# Patient Record
Sex: FEMALE | Race: WHITE
[De-identification: names, ages, dates, MRNs, and addresses within clinical notes are randomized per-mention and may not be internally consistent; named-entity substitution may affect disease eponyms.]

---

## 2018-11-28 ENCOUNTER — HOSPITAL ENCOUNTER (INPATIENT)
Dept: HOSPITAL 88 - ER | Age: 83
LOS: 7 days | Discharge: HOME | DRG: 194 | End: 2018-12-05
Attending: INTERNAL MEDICINE | Admitting: INTERNAL MEDICINE
Payer: MEDICARE

## 2018-11-28 VITALS — BODY MASS INDEX: 26.2 KG/M2 | WEIGHT: 163.01 LBS | HEIGHT: 66 IN

## 2018-11-28 DIAGNOSIS — Z88.8: ICD-10-CM

## 2018-11-28 DIAGNOSIS — Z85.3: ICD-10-CM

## 2018-11-28 DIAGNOSIS — E87.1: ICD-10-CM

## 2018-11-28 DIAGNOSIS — J91.8: ICD-10-CM

## 2018-11-28 DIAGNOSIS — J15.9: ICD-10-CM

## 2018-11-28 DIAGNOSIS — I50.22: ICD-10-CM

## 2018-11-28 DIAGNOSIS — Z88.5: ICD-10-CM

## 2018-11-28 DIAGNOSIS — Z86.11: ICD-10-CM

## 2018-11-28 DIAGNOSIS — Z95.0: ICD-10-CM

## 2018-11-28 DIAGNOSIS — J47.9: ICD-10-CM

## 2018-11-28 DIAGNOSIS — Z87.891: ICD-10-CM

## 2018-11-28 DIAGNOSIS — M19.90: ICD-10-CM

## 2018-11-28 DIAGNOSIS — I25.10: ICD-10-CM

## 2018-11-28 DIAGNOSIS — Z88.1: ICD-10-CM

## 2018-11-28 DIAGNOSIS — Z85.53: ICD-10-CM

## 2018-11-28 DIAGNOSIS — I44.7: ICD-10-CM

## 2018-11-28 DIAGNOSIS — E78.00: ICD-10-CM

## 2018-11-28 DIAGNOSIS — I11.0: ICD-10-CM

## 2018-11-28 DIAGNOSIS — R09.02: ICD-10-CM

## 2018-11-28 DIAGNOSIS — J30.2: ICD-10-CM

## 2018-11-28 DIAGNOSIS — J09.X1: Primary | ICD-10-CM

## 2018-11-28 DIAGNOSIS — J47.1: ICD-10-CM

## 2018-11-28 DIAGNOSIS — K59.00: ICD-10-CM

## 2018-11-28 LAB
ALBUMIN SERPL-MCNC: 3.6 G/DL (ref 3.5–5)
ALBUMIN/GLOB SERPL: 0.9 {RATIO} (ref 0.8–2)
ALP SERPL-CCNC: 89 IU/L (ref 40–150)
ALT SERPL-CCNC: 36 IU/L (ref 0–55)
ANION GAP SERPL CALC-SCNC: 15.5 MMOL/L (ref 8–16)
BACTERIA URNS QL MICRO: (no result) /HPF
BASOPHILS # BLD AUTO: 0 10*3/UL (ref 0–0.1)
BASOPHILS NFR BLD AUTO: 0.1 % (ref 0–1)
BILIRUB UR QL: NEGATIVE
BUN SERPL-MCNC: 11 MG/DL (ref 7–26)
BUN/CREAT SERPL: 11 (ref 6–25)
CALCIUM SERPL-MCNC: 9.7 MG/DL (ref 8.4–10.2)
CHLORIDE SERPL-SCNC: 93 MMOL/L (ref 98–107)
CK MB SERPL-MCNC: 3.5 NG/ML (ref 0–5)
CK SERPL-CCNC: 293 IU/L (ref 29–168)
CLARITY UR: (no result)
CO2 SERPL-SCNC: 27 MMOL/L (ref 22–29)
COLOR UR: YELLOW
DEPRECATED NEUTROPHILS # BLD AUTO: 13.7 10*3/UL (ref 2.1–6.9)
DEPRECATED RBC URNS MANUAL-ACNC: (no result) /HPF (ref 0–5)
EGFRCR SERPLBLD CKD-EPI 2021: 52 ML/MIN (ref 60–?)
EOSINOPHIL # BLD AUTO: 0 10*3/UL (ref 0–0.4)
EOSINOPHIL NFR BLD AUTO: 0 % (ref 0–6)
EPI CELLS URNS QL MICRO: (no result) /LPF
ERYTHROCYTE [DISTWIDTH] IN CORD BLOOD: 13.1 % (ref 11.7–14.4)
GLOBULIN PLAS-MCNC: 4.1 G/DL (ref 2.3–3.5)
GLUCOSE SERPLBLD-MCNC: 115 MG/DL (ref 74–118)
HCT VFR BLD AUTO: 40.6 % (ref 34.2–44.1)
HGB BLD-MCNC: 14.1 G/DL (ref 12–16)
KETONES UR QL STRIP.AUTO: NEGATIVE
LEUKOCYTE ESTERASE UR QL STRIP.AUTO: NEGATIVE
LYMPHOCYTES # BLD: 0.7 10*3/UL (ref 1–3.2)
LYMPHOCYTES NFR BLD AUTO: 4.5 % (ref 18–39.1)
LYMPHOCYTES NFR BLD MANUAL: 7 % (ref 19–48)
MCH RBC QN AUTO: 30.6 PG (ref 28–32)
MCHC RBC AUTO-ENTMCNC: 34.7 G/DL (ref 31–35)
MCV RBC AUTO: 88.1 FL (ref 81–99)
MONOCYTES # BLD AUTO: 1.6 10*3/UL (ref 0.2–0.8)
MONOCYTES NFR BLD AUTO: 9.6 % (ref 4.4–11.3)
MONOCYTES NFR BLD MANUAL: 5 % (ref 3.4–9)
NEUTS SEG NFR BLD AUTO: 85.2 % (ref 38.7–80)
NEUTS SEG NFR BLD MANUAL: 88 % (ref 40–74)
NITRITE UR QL STRIP.AUTO: NEGATIVE
PLAT MORPH BLD: NORMAL
PLATELET # BLD AUTO: 171 X10E3/UL (ref 140–360)
PLATELET # BLD EST: ADEQUATE 10*3/UL
POTASSIUM SERPL-SCNC: 3.5 MMOL/L (ref 3.5–5.1)
PROT UR QL STRIP.AUTO: (no result)
RBC # BLD AUTO: 4.61 X10E6/UL (ref 3.6–5.1)
RBC MORPH BLD: NORMAL
SODIUM SERPL-SCNC: 132 MMOL/L (ref 136–145)
SP GR UR STRIP: 1.01 (ref 1.01–1.02)
UROBILINOGEN UR STRIP-MCNC: 0.2 MG/DL (ref 0.2–1)
WBC #/AREA URNS HPF: (no result) /HPF (ref 0–5)

## 2018-11-28 PROCEDURE — 99284 EMERGENCY DEPT VISIT MOD MDM: CPT

## 2018-11-28 PROCEDURE — 83880 ASSAY OF NATRIURETIC PEPTIDE: CPT

## 2018-11-28 PROCEDURE — 87205 SMEAR GRAM STAIN: CPT

## 2018-11-28 PROCEDURE — 81001 URINALYSIS AUTO W/SCOPE: CPT

## 2018-11-28 PROCEDURE — 84443 ASSAY THYROID STIM HORMONE: CPT

## 2018-11-28 PROCEDURE — 83615 LACTATE (LD) (LDH) ENZYME: CPT

## 2018-11-28 PROCEDURE — 93306 TTE W/DOPPLER COMPLETE: CPT

## 2018-11-28 PROCEDURE — 80048 BASIC METABOLIC PNL TOTAL CA: CPT

## 2018-11-28 PROCEDURE — 85025 COMPLETE CBC W/AUTO DIFF WBC: CPT

## 2018-11-28 PROCEDURE — 87086 URINE CULTURE/COLONY COUNT: CPT

## 2018-11-28 PROCEDURE — 87070 CULTURE OTHR SPECIMN AEROBIC: CPT

## 2018-11-28 PROCEDURE — 36415 COLL VENOUS BLD VENIPUNCTURE: CPT

## 2018-11-28 PROCEDURE — 82550 ASSAY OF CK (CPK): CPT

## 2018-11-28 PROCEDURE — 71045 X-RAY EXAM CHEST 1 VIEW: CPT

## 2018-11-28 PROCEDURE — 93005 ELECTROCARDIOGRAM TRACING: CPT

## 2018-11-28 PROCEDURE — 82553 CREATINE MB FRACTION: CPT

## 2018-11-28 PROCEDURE — 87400 INFLUENZA A/B EACH AG IA: CPT

## 2018-11-28 PROCEDURE — 83605 ASSAY OF LACTIC ACID: CPT

## 2018-11-28 PROCEDURE — 80053 COMPREHEN METABOLIC PANEL: CPT

## 2018-11-28 PROCEDURE — 94640 AIRWAY INHALATION TREATMENT: CPT

## 2018-11-28 PROCEDURE — 83735 ASSAY OF MAGNESIUM: CPT

## 2018-11-28 PROCEDURE — 94667 MNPJ CHEST WALL 1ST: CPT

## 2018-11-28 PROCEDURE — 84484 ASSAY OF TROPONIN QUANT: CPT

## 2018-11-28 PROCEDURE — 94668 MNPJ CHEST WALL SBSQ: CPT

## 2018-11-28 PROCEDURE — 87040 BLOOD CULTURE FOR BACTERIA: CPT

## 2018-11-28 RX ADMIN — SODIUM CHLORIDE SCH GM: 9 INJECTION, SOLUTION INTRAVENOUS at 22:19
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
## 2018-11-28 NOTE — XMS REPORT
Clinical Summary

                             Created on: 2018



Mayra Bender

External Reference #: [id number]

: 1935

Sex: Female



Demographics







                          Address                   [address] [place]  [address]

 

                          Home Phone                [phone]

 

                          Preferred Language        English

 

                          Marital Status            Unknown

 

                          Evangelical Affiliation     Latter-day

 

                          Race                      White

 

                          Ethnic Group              Non-





Author







                          Author                    [facility]

 

                          Organization              [facility]

 

                          Address                   Unknown

 

                          Phone                     Unavailable







Support







                Name            Relationship    Address         Phone

 

                    Juan Diego Bender      ECON                [address]

[place]  [address]                      [phone]

 

                Tavia Bender     ECON            Unknown         [phone]







Care Team Providers







                    Care Team Member Name    Role                Phone

 

                    Brian Redding       PCP                 [phone]







Allergies







                                        Comments



                 Active Allergy     Reactions       Severity        Noted Date 

 

                                        



Jaw and tibial pain



                     Risedronate         Other (See          2014 



                                         Comments)   

 

                                        



White tape,"2nd degree burns"



                 Adhesive        Rash            Low             2015 

 

                                        



Severe jaw and tibial pain



                 Alendronate     Other (See      High            2014 



                                         Comments),   



                                         Anaphylaxis   

 

                                        



Jaw pain, gastric pain, tibial pain



                           Bisphosphonates           2017 

 

                                         



                 Oxaprozin       Rash            Low             2014 

 

                                        



Severe dream intolerances



                     Ofloxacin           Other (See          2014 



                                         Comments)   

 

                                         



                     Hydrocodone         Nausea And          2014 



                                         Vomiting   

 

                                         



                     Quinolones          Diarrhea,           2017 



                                         Nausea And   



                                         Vomiting   

 

                                        



Decreased  blood pressure



                     Tizanidine          Other (See          2014 



                                         Comments)   







Medications







                          End Date                  Status



              Medication     Sig          Dispensed     Refills      Start  



                                         Date  

 

                                                    Active



                     amiodarone (PACERONE) 100     Take 200 mg         0   



                           MG tablet                 by mouth     



                                         daily .     

 

                                                    Active



                     CALCIUM CITRATE/VITAMIN     Take 1              0   



                           D3 (CALCIUM CITRATE +     capsule by     



                           ORAL)                     mouth 2 (two)     



                                         times daily .     

 

                                                    Active



                     FERROUS FUMARATE (IRON     Take by mouth       0   



                           ORAL)                     2 (two) times     



                                         daily .     

 

                                                    Active



                     loratadine (CLARITIN) 10     Take 10 mg by       0   



                           mg tablet                 mouth daily.     

 

                                                    Active



                     metoprolol (TOPROL-XL) 25     Take 25 mg by       0   



                           MG 24 hr tablet           mouth daily.     

 

                                                    Active



                     mometasone (NASONEX) 50     2 sprays by         0   



                           mcg/actuation nasal spray     Nasal route     



                                         daily.     

 

                                                    Active



                     multivitamin        Take 1 tablet       0   



                           (MULTIVITAMIN) per tablet     by mouth     



                                         daily.     

 

                                                    Active



                     OMEGA-3S/DHA/EPA/FISH OIL     Take by             0   



                           (OMEGA 3 ORAL)            mouth.     

 

                                                    Active



                     tiotropium (SPIRIVA) 18     Inhale 18 mcg       0   



                           mcg inhalation capsule     by mouth via     



                                         inhaler     



                                         daily.     

 

                                                    Active



                     aspirin 325 MG tablet     Take 325 mg         0   



                                         by mouth     



                                         daily.     

 

                                                    Active



                     LACTOBACILLUS ACIDOPHILUS     Take 1              0   



                           (PROBIOTIC ORAL)          capsule by     



                                         mouth daily .     

 

                                                    Active



                     ketotifen (ZADITOR) 0.025     1 drop 2            0   



                           % ophthalmic solution     (two) times     



                                         daily.     

 

                                                    Active



                     bisacodyl (DULCOLAX) 5 mg     Take 5 mg by        0   



                           EC tablet                 mouth daily     



                                         as needed for     



                                         Constipation.     

 

                                                    Active



                     anastrozole (ARIMIDEX) 1     Take 1 mg by        0   



                           mg tablet                 mouth daily.     

 

                                                    Active



                     budesonide (PULMICORT) 1     Take 1 mg by        0   



                           mg/2 mL nebulizer         nebulization     



                           solution                  2 (two) times     



                                         daily.     

 

                                                    Active



                     albuterol (PROVENTIL) 2.5     Take 2.5 mg         0   



                           mg/0.5 mL Nebu nebulizer     by     



                           solution                  nebulization.     

 

                                                    Active



                     lactobacillus rhamnosus,     Take 1              0   



                           GG, (CULTURELLE) 10       capsule by     



                           billion cell capsule      mouth as     



                                         needed.     

 

                                                    Active



                     cholecalciferol (VITAMIN     Take 1,000          0   



                           D3) 1,000 unit tablet     Units by     



                                         mouth daily.     

 

                                                    Active



                     magnesium oxide (MAG-OX)     Take 400 mg         0   



                           400 mg tablet             by mouth     



                                         daily.     

 

                          2018                



              amiodarone (PACERONE) 200     Take 1 tablet     30 tablet     12             



                     MG tablet           (200 mg             7  



                                         total) by     



                                         mouth daily.     

 

                          2018                



              aspirin 325 MG tablet     Take 1 tablet     30 tablet     12             



                           (325 mg                   7  



                                         total) by     



                                         mouth daily.     







Active Problems







 



                           Problem                   Noted Date

 

 



                           Abnormal stress test      2017

 

 



                           Bronchiectasis            2015

 

 



                           SANDEEP on CPAP               2015

 

 



                           COPD (chronic obstructive pulmonary disease)     2015

 

 



                           PAF (paroxysmal atrial fibrillation)     2015

 

 



                           Renal mass                2015







Social History







                                        Date



                 Tobacco Use     Types           Packs/Day       Years Used 

 

                                         



                                         Never Smoker    

 

    



                                         Smokeless Tobacco: Never   



                                         Used   









   



                 Alcohol Use     Drinks/Week     oz/Week         Comments

 

   



                                         No   









 



                           Sex Assigned at Birth     Date Recorded

 

 



                                         Not on file 









                                        Industry



                           Job Start Date            Occupation 

 

                                        Not on file



                           Not on file               Not on file 









                                        Travel End



                           Travel History            Travel Start 

 





                                         No recent travel history available.







Last Filed Vital Signs

Not on file



Plan of Treatment





Not on file



Implants







                    Device Identifier    Shelf Expiration Date    Model / Serial / Lot



                 Implanted       Type            Area            Manufactur   



                                         er   

 

                                        10/31/2016          [id number] /

 /

[id number]



                 Sealant,Floseal Hemostatic Matrix     Cement/Moise      Left: Abdomen     SPAULDING   



                     5ml - [id number]     ler/Adhesi          BIOSCIENCE   



                     Implanted: Qty: 2 on 2015 by     ve                  Varun Bruce MD       [employer]   



                                         MEDICAL   







Results

Not on fileafter 2017



Insurance







     



            Payer      Benefit     Subscriber ID     Type       Phone      Address



                                         Plan /    



                                         Group    

 

     



                     [facility]   



                                         MEDICARE    



                                         ADV    









     



            Guarantor Name     Account     Relation to     Date of     Phone      Billing Address



                     Type                Patient             Birth  

 

     



            Mayra Bender     Personal/F     Self       1935     [phone]     [address]



                     umm               (Home)              [place] [address]







Advance Directives





Patient has advance care planning documents, and code status on file. For more i
nformation, please contact:



[place] [id number] [place] [address] [phone]









                          Date Inactivated          Comments



                           Code Status               Date Activated  

 

                          2017 12:54 PM         



                           Full Code                 2017  6:15 AM  









  



                           This code status was determined by:     Patient 









                                                     

 

                          2015  2:20 PM         



                           Full Code                 2015  4:52 PM  









  



                           This code status was determined by:     Patient

## 2018-11-28 NOTE — XMS REPORT
Patient Summary Document

                             Created on: 2018



ZITA MIRANDA

External Reference #: [id number]

: 1935

Sex: Female



Demographics







                          Address                   [address] [place] [address]  [address]

 

                          Home Phone                [phone]

 

                          Preferred Language        Unknown

 

                          Marital Status            Unknown

 

                          Jain Affiliation     Unknown

 

                          Race                      Unknown

 

                          Ethnic Group              Unknown





Author







                          Author                    [facility]

 

                          Address                   Unknown

 

                          Phone                     Unavailable







Care Team Providers







                    Care Team Member Name    Role                Phone

 

                    CARISA TREVINO    Unavailable         Unavailable







Problems

This patient has no known problems.



Allergies, Adverse Reactions, Alerts

This patient has no known allergies or adverse reactions.



Medications

This patient has no known medications.



Results







           Test Description    Test Time    Test Comments    Text Results    Atomic Results    Result

 Comments

 

                BASIC METABOLIC PANEL    2017 03:31:00                      

 

   

 

                SODIUM (BEAKER) (test code=381)    136 meq/L       136-145          

 

                POTASSIUM (BEAKER) (test code=379)    4.0 meq/L       3.5-5.1          

 

                CHLORIDE (BEAKER) (test code=382)    101 meq/L                  

 

                CO2 (BEAKER) (test code=355)    26 meq/L        22-29            

 

                BLOOD UREA NITROGEN (BEAKER) (test code=354)    18 mg/dL        7-21             

 

                CREATININE (BEAKER) (test code=358)    1.14 mg/dL      0.57-1.25        

 

                GLUCOSE RANDOM (BEAKER) (test code=652)    104 mg/dL                  

 

                CALCIUM (BEAKER) (test code=697)    9.2 mg/dL       8.4-10.2         

 

                EGFR (BEAKER) (test code=1092)    46 mL/min/1.73 sq m                    ESTIMATED GFR IS NOT AS 

ACCURATE AS CREATININE CLEARANCE IN PREDICTING GLOMERULAR FILTRATION RATE. 
ESTIMATED GFR IS NOT APPLICABLE FOR DIALYSIS PATIENTS.





CBC W/PLT COUNT & AUTO [id number] 03:09:00* 





                Test Item       Value           Reference Range    Comments

 

                WHITE BLOOD CELL COUNT (BEAKER) (test code=775)    7.1 K/ L        4.0-10.0         

 

                RED BLOOD CELL COUNT (BEAKER) (test code=761)    4.19 M/ L       4.00-5.00        

 

                HEMOGLOBIN (BEAKER) (test code=410)    13.4 GM/DL      12.0-15.0        

 

                HEMATOCRIT (BEAKER) (test code=411)    39.1 %          36.0-45.0        

 

                MEAN CORPUSCULAR VOLUME (BEAKER) (test code=753)    93.3 fL         82.0-99.0        

 

                MEAN CORPUSCULAR HEMOGLOBIN (BEAKER) (test code=751)    32.0 pg         27.0-33.0        

 

                    MEAN CORPUSCULAR HEMOGLOBIN CONC (BEAKER) (test code=752)    34.3 GM/DL          32.0-36.0

                                         

 

                RED CELL DISTRIBUTION WIDTH (BEAKER) (test code=412)    13.4 %          10.3-14.2        

 

                PLATELET COUNT (BEAKER) (test code=756)    216 K/CU MM     150-430          

 

                MEAN PLATELET VOLUME (BEAKER) (test code=754)    7.3 fL          6.5-10.5         

 

                NUCLEATED RED BLOOD CELLS (BEAKER) (test code=413)    0 /100 WBC      0-0              

 

                NEUTROPHILS RELATIVE PERCENT (BEAKER) (test code=429)    69 %                             

 

                LYMPHOCYTES RELATIVE PERCENT (BEAKER) (test code=430)    17 %                             

 

                MONOCYTES RELATIVE PERCENT (BEAKER) (test code=431)    13 %                             

 

                EOSINOPHILS RELATIVE PERCENT (BEAKER) (test code=432)    1 %                              

 

                BASOPHILS RELATIVE PERCENT (BEAKER) (test code=437)    1 %                              

 

                NEUTROPHILS ABSOLUTE COUNT (BEAKER) (test code=670)    4.90 K/ L       1.80-8.00        

 

                LYMPHOCYTES ABSOLUTE COUNT (BEAKER) (test code=414)    1.18 K/ L       1.48-4.50        

 

                MONOCYTES ABSOLUTE COUNT (BEAKER) (test code=415)    0.90 K/ L       0.00-1.30        

 

                EOSINOPHILS ABSOLUTE COUNT (BEAKER) (test code=416)    0.09 K/ L       0.00-0.50        

 

                BASOPHILS ABSOLUTE COUNT (BEAKER) (test code=417)    0.05 K/ L       0.00-0.20        





[id number] 16:35:00* 





                Test Item       Value           Reference Range    Comments

 

                ACTIVATED CLOTTING TIME (BEAKER) (test code=441)    137 sec                         TESTED AT [address] [address]





[id number] 14:26:00* 





                Test Item       Value           Reference Range    Comments

 

                ACTIVATED CLOTTING TIME (BEAKER) (test code=441)    147 sec                         TESTED AT [address] [address]





[id number] 12:46:00* 





                Test Item       Value           Reference Range    Comments

 

                ACTIVATED CLOTTING TIME (BEAKER) (test code=441)    167 sec                         TESTED AT [place] [address]





[id number] 09:51:00* 





                Test Item       Value           Reference Range    Comments

 

                ACTIVATED CLOTTING TIME (BEAKER) (test code=441)    234 sec                         TESTED AT [place] [address]





[id number] 09:51:00* 





                Test Item       Value           Reference Range    Comments

 

                ACTIVATED CLOTTING TIME (BEAKER) (test code=441)    214 sec                         TESTED AT [place] [address]

## 2018-11-29 VITALS — DIASTOLIC BLOOD PRESSURE: 65 MMHG | SYSTOLIC BLOOD PRESSURE: 149 MMHG

## 2018-11-29 VITALS — DIASTOLIC BLOOD PRESSURE: 77 MMHG | SYSTOLIC BLOOD PRESSURE: 161 MMHG

## 2018-11-29 VITALS — SYSTOLIC BLOOD PRESSURE: 161 MMHG | DIASTOLIC BLOOD PRESSURE: 77 MMHG

## 2018-11-29 VITALS — DIASTOLIC BLOOD PRESSURE: 61 MMHG | SYSTOLIC BLOOD PRESSURE: 131 MMHG

## 2018-11-29 VITALS — DIASTOLIC BLOOD PRESSURE: 80 MMHG | SYSTOLIC BLOOD PRESSURE: 132 MMHG

## 2018-11-29 VITALS — DIASTOLIC BLOOD PRESSURE: 60 MMHG | SYSTOLIC BLOOD PRESSURE: 124 MMHG

## 2018-11-29 VITALS — DIASTOLIC BLOOD PRESSURE: 66 MMHG | SYSTOLIC BLOOD PRESSURE: 129 MMHG

## 2018-11-29 VITALS — SYSTOLIC BLOOD PRESSURE: 127 MMHG | DIASTOLIC BLOOD PRESSURE: 57 MMHG

## 2018-11-29 LAB
CK MB SERPL-MCNC: 3.6 NG/ML (ref 0–5)
CK SERPL-CCNC: 219 IU/L (ref 29–168)

## 2018-11-29 RX ADMIN — HEPARIN SODIUM SCH UNIT: 5000 INJECTION INTRAVENOUS; SUBCUTANEOUS at 21:20

## 2018-11-29 RX ADMIN — SODIUM CHLORIDE SCH MLS/HR: 900 INJECTION, SOLUTION INTRAVENOUS at 13:33

## 2018-11-29 RX ADMIN — IPRATROPIUM BROMIDE AND ALBUTEROL SULFATE SCH ML: .5; 2.5 SOLUTION RESPIRATORY (INHALATION) at 02:00

## 2018-11-29 RX ADMIN — ANASTROZOLE SCH MG: 1 TABLET ORAL at 17:10

## 2018-11-29 RX ADMIN — IPRATROPIUM BROMIDE AND ALBUTEROL SULFATE SCH ML: .5; 2.5 SOLUTION RESPIRATORY (INHALATION) at 07:20

## 2018-11-29 RX ADMIN — TIOTROPIUM BROMIDE SCH MCG: 18 CAPSULE ORAL; RESPIRATORY (INHALATION) at 19:15

## 2018-11-29 RX ADMIN — Medication SCH ML: at 19:15

## 2018-11-29 RX ADMIN — SODIUM CHLORIDE PRN MG: 900 INJECTION INTRAVENOUS at 17:10

## 2018-11-29 RX ADMIN — SODIUM CHLORIDE SCH GM: 9 INJECTION, SOLUTION INTRAVENOUS at 17:09

## 2018-11-29 RX ADMIN — GUAIFENESIN SCH MG: 600 TABLET, EXTENDED RELEASE ORAL at 21:31

## 2018-11-29 RX ADMIN — HEPARIN SODIUM SCH UNIT: 5000 INJECTION INTRAVENOUS; SUBCUTANEOUS at 13:33

## 2018-11-29 RX ADMIN — FLUTICASONE PROPIONATE AND SALMETEROL SCH EA: 50; 100 POWDER RESPIRATORY (INHALATION) at 19:15

## 2018-11-29 RX ADMIN — Medication SCH ML: at 11:10

## 2018-11-29 RX ADMIN — Medication SCH ML: at 14:30

## 2018-11-30 VITALS — DIASTOLIC BLOOD PRESSURE: 67 MMHG | SYSTOLIC BLOOD PRESSURE: 131 MMHG

## 2018-11-30 VITALS — SYSTOLIC BLOOD PRESSURE: 131 MMHG | DIASTOLIC BLOOD PRESSURE: 67 MMHG

## 2018-11-30 VITALS — SYSTOLIC BLOOD PRESSURE: 130 MMHG | DIASTOLIC BLOOD PRESSURE: 79 MMHG

## 2018-11-30 VITALS — SYSTOLIC BLOOD PRESSURE: 140 MMHG | DIASTOLIC BLOOD PRESSURE: 67 MMHG

## 2018-11-30 VITALS — DIASTOLIC BLOOD PRESSURE: 55 MMHG | SYSTOLIC BLOOD PRESSURE: 122 MMHG

## 2018-11-30 VITALS — DIASTOLIC BLOOD PRESSURE: 69 MMHG | SYSTOLIC BLOOD PRESSURE: 107 MMHG

## 2018-11-30 VITALS — DIASTOLIC BLOOD PRESSURE: 78 MMHG | SYSTOLIC BLOOD PRESSURE: 107 MMHG

## 2018-11-30 LAB
ANION GAP SERPL CALC-SCNC: 12.9 MMOL/L (ref 8–16)
BASOPHILS # BLD AUTO: 0 10*3/UL (ref 0–0.1)
BASOPHILS NFR BLD AUTO: 0.3 % (ref 0–1)
BUN SERPL-MCNC: 13 MG/DL (ref 7–26)
BUN/CREAT SERPL: 13 (ref 6–25)
CALCIUM SERPL-MCNC: 9.2 MG/DL (ref 8.4–10.2)
CHLORIDE SERPL-SCNC: 100 MMOL/L (ref 98–107)
CO2 SERPL-SCNC: 29 MMOL/L (ref 22–29)
DEPRECATED NEUTROPHILS # BLD AUTO: 7.1 10*3/UL (ref 2.1–6.9)
EGFRCR SERPLBLD CKD-EPI 2021: 54 ML/MIN (ref 60–?)
EOSINOPHIL # BLD AUTO: 0 10*3/UL (ref 0–0.4)
EOSINOPHIL NFR BLD AUTO: 0.3 % (ref 0–6)
ERYTHROCYTE [DISTWIDTH] IN CORD BLOOD: 13.1 % (ref 11.7–14.4)
GLUCOSE SERPLBLD-MCNC: 109 MG/DL (ref 74–118)
HCT VFR BLD AUTO: 36.2 % (ref 34.2–44.1)
HGB BLD-MCNC: 12.2 G/DL (ref 12–16)
LYMPHOCYTES # BLD: 0.9 10*3/UL (ref 1–3.2)
LYMPHOCYTES NFR BLD AUTO: 10 % (ref 18–39.1)
MCH RBC QN AUTO: 30.3 PG (ref 28–32)
MCHC RBC AUTO-ENTMCNC: 33.7 G/DL (ref 31–35)
MCV RBC AUTO: 90 FL (ref 81–99)
MONOCYTES # BLD AUTO: 1 10*3/UL (ref 0.2–0.8)
MONOCYTES NFR BLD AUTO: 11.2 % (ref 4.4–11.3)
NEUTS SEG NFR BLD AUTO: 77.8 % (ref 38.7–80)
PLATELET # BLD AUTO: 169 X10E3/UL (ref 140–360)
POTASSIUM SERPL-SCNC: 3.9 MMOL/L (ref 3.5–5.1)
RBC # BLD AUTO: 4.02 X10E6/UL (ref 3.6–5.1)
SODIUM SERPL-SCNC: 138 MMOL/L (ref 136–145)

## 2018-11-30 RX ADMIN — SODIUM CHLORIDE PRN MG: 900 INJECTION INTRAVENOUS at 17:43

## 2018-11-30 RX ADMIN — Medication SCH ML: at 20:50

## 2018-11-30 RX ADMIN — ANASTROZOLE SCH MG: 1 TABLET ORAL at 09:29

## 2018-11-30 RX ADMIN — HEPARIN SODIUM SCH UNIT: 5000 INJECTION INTRAVENOUS; SUBCUTANEOUS at 09:34

## 2018-11-30 RX ADMIN — TIOTROPIUM BROMIDE SCH MCG: 18 CAPSULE ORAL; RESPIRATORY (INHALATION) at 14:54

## 2018-11-30 RX ADMIN — Medication SCH MG: at 14:53

## 2018-11-30 RX ADMIN — AMIODARONE HYDROCHLORIDE SCH MG: 200 TABLET ORAL at 09:29

## 2018-11-30 RX ADMIN — FLUTICASONE PROPIONATE AND SALMETEROL SCH EA: 50; 100 POWDER RESPIRATORY (INHALATION) at 20:50

## 2018-11-30 RX ADMIN — Medication SCH MG: at 20:50

## 2018-11-30 RX ADMIN — GUAIFENESIN SCH MG: 600 TABLET, EXTENDED RELEASE ORAL at 21:50

## 2018-11-30 RX ADMIN — Medication SCH ML: at 07:00

## 2018-11-30 RX ADMIN — Medication SCH MG: at 11:00

## 2018-11-30 RX ADMIN — GUAIFENESIN SCH MG: 600 TABLET, EXTENDED RELEASE ORAL at 09:29

## 2018-11-30 RX ADMIN — HEPARIN SODIUM SCH UNIT: 5000 INJECTION INTRAVENOUS; SUBCUTANEOUS at 21:51

## 2018-11-30 RX ADMIN — SODIUM CHLORIDE SCH GM: 9 INJECTION, SOLUTION INTRAVENOUS at 18:01

## 2018-11-30 RX ADMIN — ASPIRIN SCH MG: 81 TABLET, COATED ORAL at 09:29

## 2018-11-30 RX ADMIN — Medication SCH ML: at 10:47

## 2018-11-30 RX ADMIN — SODIUM CHLORIDE SCH MLS/HR: 900 INJECTION, SOLUTION INTRAVENOUS at 12:32

## 2018-11-30 RX ADMIN — TIOTROPIUM BROMIDE SCH MCG: 18 CAPSULE ORAL; RESPIRATORY (INHALATION) at 07:03

## 2018-11-30 RX ADMIN — SODIUM CHLORIDE PRN MG: 900 INJECTION INTRAVENOUS at 09:29

## 2018-11-30 RX ADMIN — SODIUM CHLORIDE SCH MLS/HR: 4.5 INJECTION, SOLUTION INTRAVENOUS at 09:29

## 2018-11-30 RX ADMIN — Medication SCH ML: at 14:54

## 2018-11-30 RX ADMIN — FLUTICASONE PROPIONATE AND SALMETEROL SCH EA: 50; 100 POWDER RESPIRATORY (INHALATION) at 07:00

## 2018-12-01 VITALS — DIASTOLIC BLOOD PRESSURE: 76 MMHG | SYSTOLIC BLOOD PRESSURE: 143 MMHG

## 2018-12-01 VITALS — DIASTOLIC BLOOD PRESSURE: 59 MMHG | SYSTOLIC BLOOD PRESSURE: 128 MMHG

## 2018-12-01 VITALS — SYSTOLIC BLOOD PRESSURE: 142 MMHG | DIASTOLIC BLOOD PRESSURE: 60 MMHG

## 2018-12-01 VITALS — SYSTOLIC BLOOD PRESSURE: 143 MMHG | DIASTOLIC BLOOD PRESSURE: 76 MMHG

## 2018-12-01 VITALS — DIASTOLIC BLOOD PRESSURE: 65 MMHG | SYSTOLIC BLOOD PRESSURE: 127 MMHG

## 2018-12-01 VITALS — DIASTOLIC BLOOD PRESSURE: 69 MMHG | SYSTOLIC BLOOD PRESSURE: 125 MMHG

## 2018-12-01 VITALS — SYSTOLIC BLOOD PRESSURE: 129 MMHG | DIASTOLIC BLOOD PRESSURE: 87 MMHG

## 2018-12-01 RX ADMIN — Medication SCH ML: at 11:56

## 2018-12-01 RX ADMIN — SODIUM CHLORIDE SCH MLS/HR: 900 INJECTION, SOLUTION INTRAVENOUS at 13:15

## 2018-12-01 RX ADMIN — ANASTROZOLE SCH MG: 1 TABLET ORAL at 10:59

## 2018-12-01 RX ADMIN — SODIUM CHLORIDE PRN MG: 900 INJECTION INTRAVENOUS at 17:57

## 2018-12-01 RX ADMIN — Medication SCH ML: at 15:24

## 2018-12-01 RX ADMIN — Medication SCH MG: at 07:55

## 2018-12-01 RX ADMIN — FLUTICASONE PROPIONATE AND SALMETEROL SCH EA: 50; 100 POWDER RESPIRATORY (INHALATION) at 20:59

## 2018-12-01 RX ADMIN — GUAIFENESIN SCH MG: 600 TABLET, EXTENDED RELEASE ORAL at 21:20

## 2018-12-01 RX ADMIN — HEPARIN SODIUM SCH UNIT: 5000 INJECTION INTRAVENOUS; SUBCUTANEOUS at 11:04

## 2018-12-01 RX ADMIN — Medication SCH ML: at 21:00

## 2018-12-01 RX ADMIN — HEPARIN SODIUM SCH UNIT: 5000 INJECTION INTRAVENOUS; SUBCUTANEOUS at 21:23

## 2018-12-01 RX ADMIN — Medication SCH MG: at 21:00

## 2018-12-01 RX ADMIN — TIOTROPIUM BROMIDE SCH MCG: 18 CAPSULE ORAL; RESPIRATORY (INHALATION) at 17:00

## 2018-12-01 RX ADMIN — Medication SCH MG: at 11:56

## 2018-12-01 RX ADMIN — GUAIFENESIN SCH MG: 600 TABLET, EXTENDED RELEASE ORAL at 10:59

## 2018-12-01 RX ADMIN — SODIUM CHLORIDE SCH GM: 9 INJECTION, SOLUTION INTRAVENOUS at 17:57

## 2018-12-01 RX ADMIN — TIOTROPIUM BROMIDE SCH MCG: 18 CAPSULE ORAL; RESPIRATORY (INHALATION) at 07:55

## 2018-12-01 RX ADMIN — ASPIRIN SCH MG: 81 TABLET, COATED ORAL at 10:59

## 2018-12-01 RX ADMIN — FLUTICASONE PROPIONATE AND SALMETEROL SCH EA: 50; 100 POWDER RESPIRATORY (INHALATION) at 07:55

## 2018-12-01 RX ADMIN — AMIODARONE HYDROCHLORIDE SCH MG: 200 TABLET ORAL at 10:59

## 2018-12-01 RX ADMIN — Medication SCH MG: at 15:24

## 2018-12-01 RX ADMIN — SODIUM CHLORIDE PRN MG: 900 INJECTION INTRAVENOUS at 10:59

## 2018-12-01 RX ADMIN — Medication SCH ML: at 07:55

## 2018-12-02 VITALS — SYSTOLIC BLOOD PRESSURE: 130 MMHG | DIASTOLIC BLOOD PRESSURE: 62 MMHG

## 2018-12-02 VITALS — DIASTOLIC BLOOD PRESSURE: 62 MMHG | SYSTOLIC BLOOD PRESSURE: 132 MMHG

## 2018-12-02 VITALS — DIASTOLIC BLOOD PRESSURE: 62 MMHG | SYSTOLIC BLOOD PRESSURE: 130 MMHG

## 2018-12-02 VITALS — DIASTOLIC BLOOD PRESSURE: 63 MMHG | SYSTOLIC BLOOD PRESSURE: 132 MMHG

## 2018-12-02 VITALS — SYSTOLIC BLOOD PRESSURE: 145 MMHG | DIASTOLIC BLOOD PRESSURE: 68 MMHG

## 2018-12-02 VITALS — SYSTOLIC BLOOD PRESSURE: 132 MMHG | DIASTOLIC BLOOD PRESSURE: 63 MMHG

## 2018-12-02 VITALS — SYSTOLIC BLOOD PRESSURE: 132 MMHG | DIASTOLIC BLOOD PRESSURE: 62 MMHG

## 2018-12-02 VITALS — SYSTOLIC BLOOD PRESSURE: 120 MMHG | DIASTOLIC BLOOD PRESSURE: 59 MMHG

## 2018-12-02 LAB
ANION GAP SERPL CALC-SCNC: 13.9 MMOL/L (ref 8–16)
BUN SERPL-MCNC: 12 MG/DL (ref 7–26)
BUN/CREAT SERPL: 13 (ref 6–25)
CALCIUM SERPL-MCNC: 9.1 MG/DL (ref 8.4–10.2)
CHLORIDE SERPL-SCNC: 98 MMOL/L (ref 98–107)
CO2 SERPL-SCNC: 26 MMOL/L (ref 22–29)
EGFRCR SERPLBLD CKD-EPI 2021: 56 ML/MIN (ref 60–?)
GLUCOSE SERPLBLD-MCNC: 100 MG/DL (ref 74–118)
LDH SERPL-CCNC: 261 IU/L (ref 125–220)
MAGNESIUM SERPL-MCNC: 2.1 MG/DL (ref 1.3–2.1)
POTASSIUM SERPL-SCNC: 3.9 MMOL/L (ref 3.5–5.1)
SODIUM SERPL-SCNC: 134 MMOL/L (ref 136–145)
TSH SERPL DL<=0.005 MIU/L-ACNC: 0.55 UIU/ML (ref 0.35–4.94)

## 2018-12-02 RX ADMIN — DOCUSATE SODIUM SCH MG: 100 TABLET, FILM COATED ORAL at 10:05

## 2018-12-02 RX ADMIN — HEPARIN SODIUM SCH UNIT: 5000 INJECTION INTRAVENOUS; SUBCUTANEOUS at 21:57

## 2018-12-02 RX ADMIN — Medication SCH ML: at 07:00

## 2018-12-02 RX ADMIN — FLUTICASONE PROPIONATE AND SALMETEROL SCH EA: 50; 100 POWDER RESPIRATORY (INHALATION) at 20:00

## 2018-12-02 RX ADMIN — Medication SCH ML: at 14:24

## 2018-12-02 RX ADMIN — AMIODARONE HYDROCHLORIDE SCH MG: 200 TABLET ORAL at 10:05

## 2018-12-02 RX ADMIN — Medication SCH ML: at 11:00

## 2018-12-02 RX ADMIN — Medication SCH MG: at 20:00

## 2018-12-02 RX ADMIN — SODIUM CHLORIDE PRN MG: 900 INJECTION INTRAVENOUS at 10:05

## 2018-12-02 RX ADMIN — Medication SCH MG: at 14:24

## 2018-12-02 RX ADMIN — GUAIFENESIN SCH MG: 600 TABLET, EXTENDED RELEASE ORAL at 10:05

## 2018-12-02 RX ADMIN — GUAIFENESIN SCH MG: 600 TABLET, EXTENDED RELEASE ORAL at 21:04

## 2018-12-02 RX ADMIN — ASPIRIN SCH MG: 81 TABLET, COATED ORAL at 10:05

## 2018-12-02 RX ADMIN — Medication SCH ML: at 20:00

## 2018-12-02 RX ADMIN — SODIUM CHLORIDE PRN MG: 900 INJECTION INTRAVENOUS at 16:50

## 2018-12-02 RX ADMIN — FLUTICASONE PROPIONATE AND SALMETEROL SCH EA: 50; 100 POWDER RESPIRATORY (INHALATION) at 07:00

## 2018-12-02 RX ADMIN — Medication SCH MG: at 07:24

## 2018-12-02 RX ADMIN — HEPARIN SODIUM SCH UNIT: 5000 INJECTION INTRAVENOUS; SUBCUTANEOUS at 10:05

## 2018-12-02 RX ADMIN — Medication SCH MG: at 11:00

## 2018-12-02 RX ADMIN — SODIUM CHLORIDE SCH MLS/HR: 900 INJECTION INTRAVENOUS at 17:04

## 2018-12-02 RX ADMIN — LACTULOSE PRN GM: 20 SOLUTION ORAL at 10:31

## 2018-12-02 RX ADMIN — SODIUM CHLORIDE SCH MLS/HR: 900 INJECTION, SOLUTION INTRAVENOUS at 12:03

## 2018-12-02 RX ADMIN — DOCUSATE SODIUM SCH MG: 100 TABLET, FILM COATED ORAL at 16:50

## 2018-12-02 RX ADMIN — ANASTROZOLE SCH MG: 1 TABLET ORAL at 10:05

## 2018-12-02 NOTE — CONSULTATION
DATE OF CONSULTATION:  December 01, 2018 



PULMONARY MEDICINE CONSULT



REFERRING PHYSICIAN:  Dr. Aponte.



REASON FOR CONSULT:  Hypoxemia.



HISTORY:  Mrs. Bender is a pleasant 83-year-old female with hypoxemia.  

Patient with baseline functional status recently, but she encountered her 

son over Thanksgiving and he had a diagnosis a little bit later of 

influenza A.  Patient began to have prodrome of flu-like symptoms over the 

next few days.  Patient had increasing shortness of breath.  Patient 

presented to our clinic on November 28, 2018.  She was thought to go to the 

hospital after chest x-ray demonstrated left lower lobe pneumonitis and 

small effusion per the patient.  Patient was being forwarded to [facility].  During course of treatment, she is requiring 

oxygen.  She states her normal oxygen level is 98% or 99%.  Now, she is at 

90% saturation on 3 liters per minute of nasal cannula.  She is also having 

some shortness of breath at this 90% range.  I am consulted.



PAST MEDICAL HISTORY:  Allergies, formally on immunotherapy.  No definite 

asthma.  She has bronchiectatic lung disease, not otherwise specified.  She 

has a history of pulmonary tuberculosis in 1967 and she underwent 2 years 

of PAS and INH therapy.  Patient had right upper lobe excision.  Patient 

with history of breast cancer in 2013 with left partial mastectomy.  She 

was given chemotherapy.  She deferred x-ray therapy.  She had in 2014 renal 

cell carcinoma of left kidney status post surgical excision.  Patient has 

sick sinus syndrome and was given a permanent pacemaker.  She has history 

of degenerative joint disease.  She has borderline hypertension.



MEDICATIONS:  She is on Advair twice a day, albuterol nebulizer, Mucomyst 

nebulizer as an outpatient.  Other medicines per records.



ALLERGIES:  TIZANIDINE, ALENDRONATE, VANCOMYCIN, OXAPROZIN, OFLOXACIN, 

HYDROCODONE, QUINOLONE.



SOCIAL HISTORY:  No smoking.  No drinking.  No drugs.  She is a former 

acute care nurse.  No pets for the last year.  She lives by herself.



FAMILY HISTORY:  Noncontributory.



REVIEW OF SYSTEMS

GENERAL:  No recent weight changes.

OPHTHALMOLOGIC:  No double vision.

ENT:  No mouth ulcers.

ENDOCRINE:  Thyroid has recently been controlled she says.

PULMONARY:  No hemoptysis.

CARDIAC:  No heart attacks.

GI:  No diarrhea, but she does have some constipation chronically.

:  No blood in urine.

NEUROLOGIC:  No seizures.

DERMATOLOGIC:  No rashes.

MUSCULOSKELETAL:  Some hip pain recently.



OBJECTIVE

VITAL SIGNS:  Afebrile, vital signs noted per electronic record.

GENERAL:  No acute distress now, able to finish sentences, but she does 

have intermittent coughing and she does have to pause to catch her breath 

some times. 

HEENT:  Normocephalic, atraumatic.  Throat midline.

NECK:  Supple.

LUNGS:  Bilateral air entry, mild-to-moderate wheezes, mild-to-moderate 

rhonchi throughout.

CARDIOVASCULAR:  S1, S2.  No murmurs, rubs or gallops.

ABDOMEN:  Soft, nontender.

EXTREMITIES:  No clubbing or cyanosis.  There is no edema. 

INTEGUMENT:  No rash or purpura.



LABS:  White count decreased from 16 to 9, hematocrit 36, platelets 169.  

Potassium 2.9, BUN 13, and creatinine 0.98.  CK was 219.



IMPRESSION

1. Hypoxemia, 90% oxygen saturation despite oxygen.

2. Influenza A.

3. Reported pneumonia and associated pleural effusion.

4. Underlying constipation.

5. Perennial allergies.

6. Suspect asthma.

7. Bronchiectasis, not otherwise specified.

8. History of sick sinus syndrome, status post permanent pacemaker.

9. History of breast cancer in 2013, status post left partial mastectomy 

and chemotherapy, but radiation therapy was deferred.  On hormones.

10. In 2014, left renal cell carcinoma status post surgery.

11. Arthritis.



PLAN:  We will check a chest x-ray to check for evolution of pneumonia and 

for effusions.  Continue to promote sputum clearance and we will offer CPT 

therapy.  Patient will get thyroid assessment.  Continue Tamiflu and 

antibiotics for community-acquired pneumonia.  Followup sputum culture to 

finalization as of now usual respiratory luanne present under incubation.  

Early mobilization will be helpful as well as DVT prophylaxis. 





Thank you very much Dr. Aponte for allowing me a chance to participate in the 

care of Mrs. Bender.  Please do not hesitate to contact me if I could help 

in any way.









DD:  12/01/2018 20:58

DT:  12/02/2018 01:22

Job#:  [id number]

## 2018-12-02 NOTE — PROGRESS NOTE
DATE:  December 02, 2018 



PULMONARY MEDICINE PROGRESS NOTE



SUBJECTIVE:  Ms. Bender was seen and examined at bedside.  Patient feels 

grossly the same as last night.  She is still low in energy.  She is short 

of breath.  It is hard to cough up some of the phlegm.



REVIEW OF SYSTEMS:  No headaches, no rash.



OBJECTIVE

VITAL SIGNS:  Afebrile.  Vital signs noted per electronic record.

GENERAL:  No acute distress, alert and calm.

HEENT:  Normocephalic, atraumatic.

NECK:  Supple.  Throat midline.

LUNGS:  Bilateral air entry, few rhonchi.

CARDIOVASCULAR:  S1, S2.  No murmurs, rubs, or gallops.

ABDOMEN:  Soft, nontender.

EXTREMITIES:  No clubbing.  No cyanosis.  There is no edema.

INTEGUMENT:  No rash or purpura.



LABS:  BUN 12, creatinine 0.96, white count 9, hematocrit 36.



IMPRESSION

1. Influenza, acute.

2. Pneumonia.

3. Bronchiectatic lung disease.

4. Pleural effusion, small.

5. Possible mild fluid overload, diastolic dysfunction by history.

6. History of right upper lobe resection, sequela of previous 

tuberculosis.





PLAN:  We will give a trial dose of Lasix 40 mg today and repeat a chest 

x-ray tomorrow.  Patient by evaluation has further chances of fluid 

overload based on the response on the x-ray.  For now, continue to treat as 

predominant pneumonia knowing that her baseline chest x-ray is probably 

scarred up and abnormal.  I have discussed with respiratory to provide 

chest physiotherapy to the patient to enhance expectoration of secretions.  

We will follow along closely.  Continue medicines for constipation and to 

help expectorate secretions.  I agree to limit steroid dosing to the least 

needed given her high infection risk.









DD:  12/02/2018 12:24

DT:  12/02/2018 12:47

Job#:  [id number]

## 2018-12-02 NOTE — DIAGNOSTIC IMAGING REPORT
EXAMINATION:  CHEST SINGLE (PORTABLE)    



INDICATION: Pneumonia.



COMPARISON:  None

     

FINDINGS:

TUBES and LINES:  Dual lead left-sided cardiac pacemaker.



LUNGS: Mild prominence of the pulmonary vasculature with redistribution

bilaterally. Patchy density in the right lung base may represent developing

pneumonia in the proper clinical setting. Bibasilar subsegmental atelectasis.



PLEURA:  No pneumothorax. Suspect small left pleural effusion.



HEART AND MEDIASTINUM:  The cardiac silhouette is borderline enlarged in size.

Calcification of aortic arch.



BONES AND SOFT TISSUES:  No acute osseous lesion.     



UPPER ABDOMEN: No free air under the diaphragm.    



IMPRESSION: 



1. Possibly developing pneumonia in the right lower lobe.



2. Mild bilateral pulmonary venous congestion.





Signed by: Dr. JOHN Banks M.D. on 12/2/2018 8:31 AM

## 2018-12-03 VITALS — DIASTOLIC BLOOD PRESSURE: 66 MMHG | SYSTOLIC BLOOD PRESSURE: 132 MMHG

## 2018-12-03 VITALS — DIASTOLIC BLOOD PRESSURE: 63 MMHG | SYSTOLIC BLOOD PRESSURE: 138 MMHG

## 2018-12-03 VITALS — SYSTOLIC BLOOD PRESSURE: 132 MMHG | DIASTOLIC BLOOD PRESSURE: 58 MMHG

## 2018-12-03 VITALS — SYSTOLIC BLOOD PRESSURE: 133 MMHG | DIASTOLIC BLOOD PRESSURE: 60 MMHG

## 2018-12-03 VITALS — SYSTOLIC BLOOD PRESSURE: 133 MMHG | DIASTOLIC BLOOD PRESSURE: 86 MMHG

## 2018-12-03 VITALS — SYSTOLIC BLOOD PRESSURE: 132 MMHG | DIASTOLIC BLOOD PRESSURE: 66 MMHG

## 2018-12-03 VITALS — SYSTOLIC BLOOD PRESSURE: 135 MMHG | DIASTOLIC BLOOD PRESSURE: 66 MMHG

## 2018-12-03 RX ADMIN — DOCUSATE SODIUM SCH MG: 100 TABLET, FILM COATED ORAL at 10:15

## 2018-12-03 RX ADMIN — DOCUSATE SODIUM SCH MG: 100 TABLET, FILM COATED ORAL at 17:48

## 2018-12-03 RX ADMIN — Medication SCH ML: at 07:00

## 2018-12-03 RX ADMIN — SODIUM CHLORIDE SCH MLS/HR: 900 INJECTION INTRAVENOUS at 05:30

## 2018-12-03 RX ADMIN — HEPARIN SODIUM SCH UNIT: 5000 INJECTION INTRAVENOUS; SUBCUTANEOUS at 10:15

## 2018-12-03 RX ADMIN — Medication SCH MG: at 11:00

## 2018-12-03 RX ADMIN — FLUTICASONE PROPIONATE AND SALMETEROL SCH EA: 50; 100 POWDER RESPIRATORY (INHALATION) at 19:15

## 2018-12-03 RX ADMIN — SODIUM CHLORIDE SCH MLS/HR: 900 INJECTION, SOLUTION INTRAVENOUS at 13:09

## 2018-12-03 RX ADMIN — ANASTROZOLE SCH MG: 1 TABLET ORAL at 10:15

## 2018-12-03 RX ADMIN — LACTULOSE PRN GM: 20 SOLUTION ORAL at 06:16

## 2018-12-03 RX ADMIN — Medication SCH MG: at 19:15

## 2018-12-03 RX ADMIN — GUAIFENESIN SCH MG: 600 TABLET, EXTENDED RELEASE ORAL at 10:15

## 2018-12-03 RX ADMIN — SODIUM CHLORIDE PRN MG: 900 INJECTION INTRAVENOUS at 10:20

## 2018-12-03 RX ADMIN — Medication SCH ML: at 19:15

## 2018-12-03 RX ADMIN — Medication SCH ML: at 14:45

## 2018-12-03 RX ADMIN — AMIODARONE HYDROCHLORIDE SCH MG: 200 TABLET ORAL at 10:15

## 2018-12-03 RX ADMIN — CEFTAZIDIME SODIUM SCH MLS/HR: 40 INJECTION, SOLUTION INTRAVENOUS at 22:03

## 2018-12-03 RX ADMIN — GUAIFENESIN SCH MG: 600 TABLET, EXTENDED RELEASE ORAL at 22:03

## 2018-12-03 RX ADMIN — ASPIRIN SCH MG: 81 TABLET, COATED ORAL at 10:15

## 2018-12-03 RX ADMIN — Medication SCH MG: at 14:44

## 2018-12-03 RX ADMIN — Medication SCH ML: at 11:00

## 2018-12-03 RX ADMIN — SODIUM CHLORIDE PRN MG: 900 INJECTION INTRAVENOUS at 17:48

## 2018-12-03 RX ADMIN — Medication SCH MG: at 07:05

## 2018-12-03 RX ADMIN — HEPARIN SODIUM SCH UNIT: 5000 INJECTION INTRAVENOUS; SUBCUTANEOUS at 22:08

## 2018-12-03 RX ADMIN — FLUTICASONE PROPIONATE AND SALMETEROL SCH EA: 50; 100 POWDER RESPIRATORY (INHALATION) at 07:00

## 2018-12-03 NOTE — PROGRESS NOTE
DATE:  December 03, 2018 



PULMONARY MEDICINE PROGRESS NOTE



SUBJECTIVE:  Ms. Bender was seen and examined at bedside. She feels 

symptoms slightly better. She is tolerating the CPT device well. Patient 

eating well. Overall no fevers. 



REVIEW OF SYSTEMS:  No headaches, no rash.



OBJECTIVE

VITAL SIGNS:  Afebrile. Vital signs noted per electronic record.

GENERALLY:  No acute distress, alert and calm. 

HEENT:  Normocephalic, atraumatic.

NECK:  Supple. Throat midline.

LUNGS:  Bilateral air entry, a few rhonchi, less wheezes than before.

CARDIOVASCULAR:  S1 and S2. No murmurs, rubs or gallops.

ABDOMINAL:  Soft, nontender.

EXTREMITIES:  No clubbing, no cyanosis. There is no edema.

INTEGUMENT:  No rash. No purpura.



LABS:  There are no updates. 



IMPRESSION AND PLAN

1. Pneumonia.

2. Influenza A.

3. Bronchiectasis with exacerbation.

4. Hyponatremia, improving.

5. History of tuberculosis and right upper lobectomy.



Continue supportive therapy. Antibiotics ongoing. Follow up cultures until 

finalization. Continue inhaled nebulized treatments and N-acetylcysteine, 

which was her outpatient medication. Continue Tamiflu. Mobilize the 

patient. Wean oxygen as tolerated and check her oxygen room-air oxygen 

saturation. If patient continues to expectorate well, she may be able to go 

home soon.









DD:  12/03/2018 10:59

DT:  12/03/2018 12:31

Job#:  [id number]

## 2018-12-03 NOTE — DIAGNOSTIC IMAGING REPORT
EXAM: CHEST SINGLE (PORTABLE), AP 1 view

INDICATION: Effusion

COMPARISON: AP view of the chest December 2, 2018



FINDINGS:

LINES/TUBES: Stable left approach dual lead cardiac device.



LUNGS: Emphysematous changes, biapical scarring and bibasilar subsegmental

atelectasis. 



PLEURA: No effusions or pneumothorax.



HEART AND MEDIASTINUM: Stable appearance.



BONES AND SOFT TISSUES: No acute findings. 



IMPRESSION:

No interval change in appearance of the chest.







Signed by: Dr. Dede Leroy M.D. on 12/3/2018 6:30 AM

## 2018-12-03 NOTE — CONSULTATION
DATE OF CONSULTATION:  2018 



REASON FOR CONSULTATION:  Low left ventricular function.



HISTORY OF PRESENT ILLNESS:  Ms. Bender is an 83-year-old lady with past 

medical history of hypertension; bronchiectatic lung disease; remote 

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 

excision; history of breast cancer in  with left partial mastectomy; 

renal cell carcinoma, status post left radical nephrectomy in ; and 

sick sinus syndrome, status post pacemaker around  and also a history 

of mild 1-vessel CAD with left heart catheterization in  with a 40% LAD 

lesion.  Patient is known to have left bundle branch block for at least 40 

years.



In terms of activity level, she reports a history of being previously very 

active; however, in the last year, she has a progressive decline.  She 

reports that now moderate activities such as mowing the yard, doing 

household activities makes her short of breath and has noted exercise 

limitations that are rather unusual for her.



Nonetheless, patient reports that she was exposed to her son around 

Thanksgiving time who was later tested to be positive with influenza A.  

She monitored her symptoms and reports that on 2018, 

she started to develop aches and by Wednesday, 2018, she came 

down with full-blown myalgias, respiratory distress, cough that is 

productive of clear sputum, fevers, chills, and hypoxia; and after calling 

[facility], they advised her to come to the hospital.  She was found to 

have positive influenza A titer and was treated for that with also 

broad-spectrum antibiotics in case if there is any superimposed secondary 

bacterial infection.  She reports moderate improvement in her pulmonary 

state, however, is not quite back to baseline and has noted some slight 

wheezing.



She had an echocardiogram checked which revealed significant 

intraventricular dyssynchrony with EF of 35% with abnormal septal wall 

motion consistent with her left bundle branch block.  She was concerned to 

have maybe some superimposed volume overload and received IV Lasix 

yesterday with only minimal improvement in chest x-ray and symptoms.



Cardiology is consulted for further evaluation.



PAST MEDICAL HISTORY

1.  Hypertension, essential.

2.  History of atrial arrhythmias, on antiarrhythmic therapy.

3.  History of mild to moderate 1-vessel CAD and LAD with 40% lesion on 

heart catheterization in .

4.  Bronchiectatic lung disease.

5.  Remote history of pulmonary TB, status post INH and history of right 

upper lobe excision.

6.  History of breast cancer, status post left partial mastectomy in 2013 

and subsequent chemotherapy.

7.  History of renal cell carcinoma status post left radical nephrectomy.

8.  Sick sinus syndrome with pacemaker implanted in  and generator 

exchange sounds like around .



FAMILY HISTORY:  Father  at 92 with old age, mother  in her late 

70s and had a whole bunch of issues, but no family history of coronary 

artery disease.



SOCIAL HISTORY:  She is a lifelong nonsmoker.  Denies any alcohol or 

illicit drug use.



ALLERGIES:  VERY NUMEROUS INCLUDING QUINOLONES, FOSAMAX, ATORVASTATIN, 

HYDROCODONE, OXAPROZIN, RISEDRONATE, TIZANIDINE, AND VANCOMYCIN.



REVIEW OF SYSTEMS

GENERAL:  Positive for fevers and chills that are improving.  No weight 

changes.

HEENT:  Denies any visual complaints.  Positive for sore throat and stuffy 

nose.

RESPIRATORY:  Positive for coughing and intermittent wheezing.

CARDIOVASCULAR:  Denies any chest pain, exertional dyspnea as noted above.  

Denies any kathleen orthopnea, PND or any lower extremity edema.

GI:  Denies any abdominal pain, bright red blood per rectum, melena, or 

hematemesis.

:  Denies any dysuria, pyuria, or changes in urinary frequency.

MUSCULOSKELETAL:  Has some arthritic pains in the back.

HEMATOLOGY:  Positive for easy bruising.  No bleeding.

ID:  No know previous infectious issues.

NEUROLOGIC:  Denies any focal weakness, numbness, tingling, seizures, 

headaches, TIA or stroke.

The remainder of the review of systems negative otherwise mentioned.



PHYSICAL EXAMINATION

VITAL SIGNS:  Height of 66 inches, weight of 130 pounds, BMI is 21.0.  

Temperature of 96.5, pulse of 78, respiratory rate 18, blood pressure 

138/63, and O2 sat 94% on room air.

GENERAL:  This is a well-nourished frail lady who is currently in no 

apparent distress.

HEENT:  Pupils equal, round and react to light.  Extraocular movements are 

intact.  Oropharynx is clear.

NECK:  No elevation of jugular venous pulsation.  No carotid bruits.

CARDIOVASCULAR:  Regular rate and rhythm.  Normal S1 and S2.  A 1/6 

systolic murmur at the left lower sternal border.

LUNGS:  There is a positive pacemaker in the left shoulder region.  There 

is diffuse rhonchi, wheezing and left lower lobe type crackles.

ABDOMEN:  Soft, skinny, nontender, nondistended.  Normoactive bowel sounds.

BACK:  No costovertebral angle tenderness.

EXTREMITIES:  Warm with no edema.  There is 1+ bilateral radial pulses, 1+ 

pedal pulses.

NEUROLOGIC:  Cranial nerves II through XII are intact.  Strength is 

seemingly preserved and she appears nonfocal.

PSYCHIATRIC:   Normal fluent speech.  Appropriate affect.  No anxiety or 

delusion.



LABS:  White count of 9.1, hemoglobin 12.2, hematocrit of 36.2, and 

platelets of 169,000.  Sodium 134, potassium 3.0, chloride 98, bicarb 26, 

BUN 12, creatinine 0.96, glucose of 100, calcium of 9.1, magnesium 2.1.  

LDH was 261, TSH was 0.546.  UA is unremarkable.



Chest x-ray reveals biapical scarring, bibasilar subsegmental atelectasis, 

and right lower lobe infiltrate and some nonspecific markings.



EKG reveals sinus rhythm, left bundle branch block with QRS duration of 

about 175 msec and nonspecific ST-T wave changes.



Echo reveals left ventricular ejection fraction of 35% with abnormal septal 

wall motion compatible with bundle branch block pattern.



DIAGNOSES

1.  Influenza/pneumonia most likely etiology of her pulmonary symptoms at 

the present time.

2.  Chronic systolic heart failure with baseline [employer] 

stage 3 heart failure symptoms with ejection fraction of 35%, most likely 

due to intraventricular dyssynchrony from left bundle branch block.

3.  Complicated left bundle branch block.

4.  History of bronchiectatic lung disease.

5.  Coronary artery disease, mild to moderate 1 vessel with 40% left 

anterior descending 1 year ago.

6.  Hypertension.

7.  Hypercholesterolemia.



PLAN/RECOMMENDATIONS

1.  From a cardiovascular standpoint, we will continue Toprol-XL therapy 

and clinically the patient does not seem to be hiding much volume on board.

2.  We will go ahead and check BNP level with tomorrow a.m. labs.

3.  Continue IV antibiotic therapy for treatment of underlying infectious 

issues.

4.  Introduce the concept of resynchronization pacing and the patient may 

benefit from this due to her left bundle branch block and symptoms of 

progressive decline over the last year was noted.

5.  Extensive discussion with the patient including explanation of 

underlying issues.

6.  We will continue to follow this patient.



Thank you for this referral.









DD:  2018 21:21

DT:  2018 21:49

Job#:  [id number] 



KEYANA

## 2018-12-04 VITALS — DIASTOLIC BLOOD PRESSURE: 57 MMHG | SYSTOLIC BLOOD PRESSURE: 115 MMHG

## 2018-12-04 VITALS — SYSTOLIC BLOOD PRESSURE: 133 MMHG | DIASTOLIC BLOOD PRESSURE: 65 MMHG

## 2018-12-04 VITALS — SYSTOLIC BLOOD PRESSURE: 127 MMHG | DIASTOLIC BLOOD PRESSURE: 58 MMHG

## 2018-12-04 VITALS — DIASTOLIC BLOOD PRESSURE: 66 MMHG | SYSTOLIC BLOOD PRESSURE: 135 MMHG

## 2018-12-04 VITALS — DIASTOLIC BLOOD PRESSURE: 66 MMHG | SYSTOLIC BLOOD PRESSURE: 131 MMHG

## 2018-12-04 VITALS — SYSTOLIC BLOOD PRESSURE: 115 MMHG | DIASTOLIC BLOOD PRESSURE: 71 MMHG

## 2018-12-04 VITALS — SYSTOLIC BLOOD PRESSURE: 140 MMHG | DIASTOLIC BLOOD PRESSURE: 61 MMHG

## 2018-12-04 LAB
ALBUMIN SERPL-MCNC: 2.9 G/DL (ref 3.5–5)
ALBUMIN/GLOB SERPL: 0.8 {RATIO} (ref 0.8–2)
ALP SERPL-CCNC: 80 IU/L (ref 40–150)
ALT SERPL-CCNC: 32 IU/L (ref 0–55)
ANION GAP SERPL CALC-SCNC: 14.3 MMOL/L (ref 8–16)
BASOPHILS # BLD AUTO: 0 10*3/UL (ref 0–0.1)
BASOPHILS NFR BLD AUTO: 0.3 % (ref 0–1)
BUN SERPL-MCNC: 17 MG/DL (ref 7–26)
BUN/CREAT SERPL: 15 (ref 6–25)
CALCIUM SERPL-MCNC: 9.5 MG/DL (ref 8.4–10.2)
CHLORIDE SERPL-SCNC: 98 MMOL/L (ref 98–107)
CO2 SERPL-SCNC: 30 MMOL/L (ref 22–29)
DEPRECATED NEUTROPHILS # BLD AUTO: 5 10*3/UL (ref 2.1–6.9)
EGFRCR SERPLBLD CKD-EPI 2021: 48 ML/MIN (ref 60–?)
EOSINOPHIL # BLD AUTO: 0 10*3/UL (ref 0–0.4)
EOSINOPHIL NFR BLD AUTO: 0 % (ref 0–6)
ERYTHROCYTE [DISTWIDTH] IN CORD BLOOD: 12.9 % (ref 11.7–14.4)
GLOBULIN PLAS-MCNC: 3.6 G/DL (ref 2.3–3.5)
GLUCOSE SERPLBLD-MCNC: 111 MG/DL (ref 74–118)
HCT VFR BLD AUTO: 35 % (ref 34.2–44.1)
HGB BLD-MCNC: 11.9 G/DL (ref 12–16)
LYMPHOCYTES # BLD: 0.8 10*3/UL (ref 1–3.2)
LYMPHOCYTES NFR BLD AUTO: 12.2 % (ref 18–39.1)
MCH RBC QN AUTO: 30.1 PG (ref 28–32)
MCHC RBC AUTO-ENTMCNC: 34 G/DL (ref 31–35)
MCV RBC AUTO: 88.4 FL (ref 81–99)
MONOCYTES # BLD AUTO: 0.9 10*3/UL (ref 0.2–0.8)
MONOCYTES NFR BLD AUTO: 13.1 % (ref 4.4–11.3)
NEUTS SEG NFR BLD AUTO: 72.5 % (ref 38.7–80)
PLATELET # BLD AUTO: 297 X10E3/UL (ref 140–360)
POTASSIUM SERPL-SCNC: 4.3 MMOL/L (ref 3.5–5.1)
RBC # BLD AUTO: 3.96 X10E6/UL (ref 3.6–5.1)
SODIUM SERPL-SCNC: 138 MMOL/L (ref 136–145)
TSH SERPL DL<=0.005 MIU/L-ACNC: 0.39 UIU/ML (ref 0.35–4.94)

## 2018-12-04 RX ADMIN — HEPARIN SODIUM SCH UNIT: 5000 INJECTION INTRAVENOUS; SUBCUTANEOUS at 09:37

## 2018-12-04 RX ADMIN — Medication SCH MG: at 15:00

## 2018-12-04 RX ADMIN — SODIUM CHLORIDE SCH MLS/HR: 900 INJECTION, SOLUTION INTRAVENOUS at 12:11

## 2018-12-04 RX ADMIN — AMIODARONE HYDROCHLORIDE SCH MG: 200 TABLET ORAL at 09:45

## 2018-12-04 RX ADMIN — SODIUM CHLORIDE PRN MG: 900 INJECTION INTRAVENOUS at 16:14

## 2018-12-04 RX ADMIN — Medication SCH MG: at 19:00

## 2018-12-04 RX ADMIN — SODIUM CHLORIDE PRN MG: 900 INJECTION INTRAVENOUS at 09:46

## 2018-12-04 RX ADMIN — CEFTAZIDIME SODIUM SCH MLS/HR: 40 INJECTION, SOLUTION INTRAVENOUS at 21:43

## 2018-12-04 RX ADMIN — ASPIRIN SCH MG: 81 TABLET, COATED ORAL at 09:45

## 2018-12-04 RX ADMIN — GUAIFENESIN SCH MG: 600 TABLET, EXTENDED RELEASE ORAL at 21:43

## 2018-12-04 RX ADMIN — METOPROLOL SUCCINATE SCH MG: 25 TABLET, EXTENDED RELEASE ORAL at 12:15

## 2018-12-04 RX ADMIN — GUAIFENESIN SCH MG: 600 TABLET, EXTENDED RELEASE ORAL at 09:45

## 2018-12-04 RX ADMIN — ANASTROZOLE SCH MG: 1 TABLET ORAL at 09:45

## 2018-12-04 RX ADMIN — Medication SCH MG: at 07:00

## 2018-12-04 RX ADMIN — FLUTICASONE PROPIONATE AND SALMETEROL SCH EA: 50; 100 POWDER RESPIRATORY (INHALATION) at 07:00

## 2018-12-04 RX ADMIN — Medication SCH ML: at 19:00

## 2018-12-04 RX ADMIN — Medication SCH ML: at 15:00

## 2018-12-04 RX ADMIN — DOCUSATE SODIUM SCH MG: 100 TABLET, FILM COATED ORAL at 09:45

## 2018-12-04 RX ADMIN — DOCUSATE SODIUM SCH MG: 100 TABLET, FILM COATED ORAL at 16:13

## 2018-12-04 RX ADMIN — HEPARIN SODIUM SCH UNIT: 5000 INJECTION INTRAVENOUS; SUBCUTANEOUS at 21:44

## 2018-12-04 RX ADMIN — Medication SCH ML: at 07:00

## 2018-12-04 RX ADMIN — FLUTICASONE PROPIONATE AND SALMETEROL SCH EA: 50; 100 POWDER RESPIRATORY (INHALATION) at 19:00

## 2018-12-04 RX ADMIN — CEFTAZIDIME SODIUM SCH MLS/HR: 40 INJECTION, SOLUTION INTRAVENOUS at 09:45

## 2018-12-04 NOTE — DISCHARGE SUMMARY
HARRISON BYRD, LENGTH 0:1





 



 _________________________________

JACQUELIN GARSIA MD



DD:  12/04/2018 12:40

DT:  12/04/2018 14:13

Job#:  [id number]

## 2018-12-04 NOTE — PROGRESS NOTE
DATE:  December 04, 2018 



PULMONARY MEDICINE PROGRESS NOTE



SUBJECTIVE:  Ms. Bender was seen and examined at bedside.  She is not 

grossly changed from yesterday.  However, she still has moderate to large 

amount of wheezes. She has retained secretions that are not coming up.  She 

used the vest device about 4 times yesterday.  She is currently on room air 

FiO2 with 98% oxygen saturation.  



REVIEW OF SYSTEMS:  No headaches, no rash.



OBJECTIVE

VITAL SIGNS:  Afebrile. Vital signs noted per electronic record.

GENERAL:  No acute distress, alert and calm. 

HEENT:  Normocephalic, atraumatic.

NECK:  Supple.  Throat midline.

LUNGS:  Bilateral air entry is good.  Moderate to large wheezing.  Moderate 

rhonchi.

CARDIOVASCULAR:  S1 and S2.  No murmurs, rubs or gallops.

ABDOMEN:  Soft, nontender.

EXTREMITIES:  No clubbing, no cyanosis, no edema.

INTEGUMENT:  No rash. No purpura.



LABS:  Potassium 4.3, 17 BUN, 1.1 creatinine, 7 white count, 35 hematocrit, 

297 platelets.



IMPRESSION AND PLAN

1. Acute influenza pneumonia.

2. Bronchiectasis with exacerbation and pneumonia. 

3. Hyponatremia, resolved. 

4. History of tuberculosis and right upper lobectomy.



At this time, will continue chest physiotherapy with mechanized vest.  The 

patient was also recommended to engage with postural therapy given the slow 

progress so that she can better enhance drainage.  Continue antibiotics.  

Sputum culture was negative.  Continue acetylcysteine as ordered by her 

outside physician.  DVT prophylaxis is ongoing.  Encourage walking. The 

patient is improved and can go home soon.  However, as noted, there is a 

lag in her progress.







DD:  12/04/2018 12:44

DT:  12/04/2018 12:50

Job#:  [id number]

## 2018-12-05 VITALS — SYSTOLIC BLOOD PRESSURE: 133 MMHG | DIASTOLIC BLOOD PRESSURE: 61 MMHG

## 2018-12-05 VITALS — SYSTOLIC BLOOD PRESSURE: 132 MMHG | DIASTOLIC BLOOD PRESSURE: 71 MMHG

## 2018-12-05 VITALS — SYSTOLIC BLOOD PRESSURE: 130 MMHG | DIASTOLIC BLOOD PRESSURE: 71 MMHG

## 2018-12-05 VITALS — SYSTOLIC BLOOD PRESSURE: 135 MMHG | DIASTOLIC BLOOD PRESSURE: 65 MMHG

## 2018-12-05 RX ADMIN — Medication SCH MG: at 12:00

## 2018-12-05 RX ADMIN — Medication SCH MG: at 07:55

## 2018-12-05 RX ADMIN — ASPIRIN SCH MG: 81 TABLET, COATED ORAL at 09:00

## 2018-12-05 RX ADMIN — METOPROLOL SUCCINATE SCH MG: 25 TABLET, EXTENDED RELEASE ORAL at 09:00

## 2018-12-05 RX ADMIN — FLUTICASONE PROPIONATE AND SALMETEROL SCH EA: 50; 100 POWDER RESPIRATORY (INHALATION) at 07:55

## 2018-12-05 RX ADMIN — HEPARIN SODIUM SCH UNIT: 5000 INJECTION INTRAVENOUS; SUBCUTANEOUS at 09:00

## 2018-12-05 RX ADMIN — Medication SCH ML: at 07:55

## 2018-12-05 RX ADMIN — LACTULOSE PRN GM: 20 SOLUTION ORAL at 04:24

## 2018-12-05 RX ADMIN — GUAIFENESIN SCH MG: 600 TABLET, EXTENDED RELEASE ORAL at 09:00

## 2018-12-05 RX ADMIN — DOCUSATE SODIUM SCH MG: 100 TABLET, FILM COATED ORAL at 09:00

## 2018-12-05 RX ADMIN — CEFTAZIDIME SODIUM SCH MLS/HR: 40 INJECTION, SOLUTION INTRAVENOUS at 09:00

## 2018-12-05 RX ADMIN — ANASTROZOLE SCH MG: 1 TABLET ORAL at 09:00

## 2018-12-05 RX ADMIN — Medication SCH ML: at 12:00

## 2018-12-05 RX ADMIN — AMIODARONE HYDROCHLORIDE SCH MG: 200 TABLET ORAL at 09:00

## 2018-12-05 NOTE — PROGRESS NOTE
DATE:  December 05, 2018 



PULMONARY MEDICINE PROGRESS NOTE



SUBJECTIVE:  Ms. Bender was seen and examined at bedside.  She continues to 

have slow but steady progress.  She was able to expectorate some amounts 

after getting CPT.  Patient without any fevers.  She is able to walk around 

more and 95% oxygen saturation on room air FIO2.  She did not significantly 

desaturate after walking.



REVIEW OF SYSTEMS:  No headaches.



OBJECTIVE 

VITAL SIGNS:  Noted per electronic record.

GENERAL:  No acute distress.  

HEENT:  Normocephalic.

NECK:  Supple.

LUNGS:  Bilateral air entry.  Mild wheezes.  Mild rhonchi.

CARDIOVASCULAR:  S1 and S2.  

ABDOMEN:  Soft.

EXTREMITIES:  No clubbing.

INTEGUMENT:  No rash.



IMPRESSION AND PLAN 

1.  Bronchiectasis with exacerbation.

2.  Pneumonia.

3.  Influenza A.

4.  Weakness.

5.  Hypoxemia, improved.





Patient continues her antibiotic course as an outpatient.  Oral antibiotics 

are written by Dr. Aponte.  I have written additional prednisone on a demand 

basis.  For the most part, the patient should continue to ambulate and stay 

hydrated.  She is to follow up as an outpatient. 









DD:  12/05/2018 13:22

DT:  12/05/2018 13:26

Job#:  [id number]

## 2018-12-05 NOTE — DISCHARGE SUMMARY
PRIMARY CARE DOCTOR:  Dr. Gilberto Redding with [facility].



FINAL DIAGNOSIS:  Pneumonia.



SECONDARY DIAGNOSES 

1.  Influenza A.

2.  Hyponatremia, resolved.

3.  Chronic obstructive pulmonary disease with bronchiectasis. 

4.  Chronic systolic congestive heart failure, ejection fraction 35%, 

compensated. 

5.  Coronary artery disease with mild to moderate 1-vessel disease. 

6.  Hypertension. 



CONSULTANTS

1.  Dr. Alas, cardiology.  

2.  Dr. Euceda, pulmonologist. 



PROCEDURE/STUDY PERFORMED:  Echocardiogram. 



HISTORY:  Per H and P.



HOSPITAL COURSE:  The patient was admitted.  She completed her Tamiflu.  

The patient initially was on IV Rocephin and azithromycin.  Subsequently, 

the patient gave a history of pseudomonas infection in the past.  

Therefore, her Rocephin was changed to ceftazidime.  The patient had a lot 

of trouble producing sputum.  Therefore, percussion therapy was initiated 

per pulmonologist and this has helped.  Initially, the patient was hypoxic. 

 However, currently the patient is ambulating in the hallway without any 

shortness of breath.  The patient completed a 1-week course of IV 

antibiotics here.  After discussing with the pulmonologist, we will send 

her home on Ceftin 250 mg twice a day for another 7 days given likely 

bronchiectasis.  The patient was seen and examined today.  It took 35 

minutes total to discharge this patient.



CONDITION ON DISCHARGE:  Improved.  



DISCHARGE MEDICATIONS:  Please see medication reconciliation form.  



Of note, cardiology is recommending as an outpatient to upgrade her 

pacemaker to a BIVI.  



 



 _________________________________

CARRIE ORLANDO M.D.



DD:  12/05/2018 12:34

DT:  12/05/2018 13:51

Job#:  [id number]



cc:GILBERTO REDDING MD

## 2019-10-05 ENCOUNTER — HOSPITAL ENCOUNTER (EMERGENCY)
Dept: HOSPITAL 88 - FSED | Age: 84
LOS: 1 days | Discharge: HOME | End: 2019-10-06
Payer: MEDICARE

## 2019-10-05 VITALS — WEIGHT: 163 LBS | BODY MASS INDEX: 26.2 KG/M2 | HEIGHT: 66 IN
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
## 2019-10-05 DIAGNOSIS — S51.011A: ICD-10-CM
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
## 2019-10-05 DIAGNOSIS — S22.41XA: ICD-10-CM

## 2019-10-05 DIAGNOSIS — E78.5: ICD-10-CM

## 2019-10-05 DIAGNOSIS — Z85.3: ICD-10-CM

## 2019-10-05 DIAGNOSIS — I10: ICD-10-CM

## 2019-10-05 DIAGNOSIS — R07.89: Primary | ICD-10-CM

## 2019-10-05 DIAGNOSIS — Z85.528: ICD-10-CM

## 2019-10-05 PROCEDURE — 70450 CT HEAD/BRAIN W/O DYE: CPT

## 2019-10-05 PROCEDURE — 71250 CT THORAX DX C-: CPT

## 2019-10-05 PROCEDURE — 99283 EMERGENCY DEPT VISIT LOW MDM: CPT

## 2019-10-06 NOTE — DIAGNOSTIC IMAGING REPORT
EXAMINATION: Head CT without contrast.  





HISTORY:Fall.



COMPARISON:None.

TECHNIQUE: Multidetector axial images were obtained from the foramen magnum to

the vertex without contrast. The images were reconstructed using brain and bone

algorithms.  Thin section brain images were reformatted into coronal and

sagittal planes.

Dose modulation, iterative reconstruction, and/or weight based adjustment of

the mA/kV was utilized to reduce the radiation dose to as low as reasonably

achievable.



Intravenous contrast: None  



IMAGE QUALITY: Acceptable.



FINDINGS:

    Skull/scalp: No lytic or blastic. lesions.  No surgical changes.

    Parenchyma: Nonspecific few, scattered supratentorial white matter

hypodensity are likely related to small vessel ischemic changes.

No acute hemorrhage, mass or acute major vascular territorial infarct.

    Arteries: No density suggestive of thrombosis.   

    Dural sinuses: No abnormal density suggestive of thrombosis. 

    Ventricles: No hydrocephalus or displacement.

    Extra-axial spaces: No abnormal density.  

    Brain volume: Generalized age-related cerebral volume loss.

    Craniocervical junction: No mass, Chiari malformation, or basilar

invagination.

    Sella: No mass. 

    Paranasal/mastoid sinuses: Imaged portions unremarkable.





IMPRESSION:

No acute intracranial abnormality.



Mild supratentorial white matter microvascular ischemic changes.



Signed by: Dr. Cha Suero M.D. on 10/6/2019 12:20 AM

## 2019-10-06 NOTE — DIAGNOSTIC IMAGING REPORT
EXAM: CT Chest WITHOUT contrast

INDICATION: Fall onto right shoulder      

COMPARISON: Chest x-ray 12/3/2018



TECHNIQUE:

Chest was scanned utilizing a multidetector helical scanner from the lung apex

through the level of the adrenal glands without administration of IV contrast.

Absence of intravenous contrast decreases sensitivity for detection of

lymphadenopathy and vascular pathology. Coronal and sagittal reformations were

obtained. Routine protocol was performed.

     IV CONTRAST: None



COMPLICATIONS: None   



RADIATION DOSE: 

     Total DLP: 753 mGy*cm

     Estimated effective dose: (DLP x 0.014 x size factor) mSv

     CTDIvol has been reviewed. It is below the limits set by the Radiation

Protocol Committee (RPC).

     Dose modulation, iterative reconstruction, and/or weight based adjustment

of the mA/kV was utilized to reduce the radiation dose to as low as reasonably

achievable. 

           

FINDINGS:



LINES/ TUBES: None.



LUNGS/AIRWAYS/PLEURA:  A 0.6 cm right lower lobe subpleural nodule along the

lower right medial pleura (series 2 image 85). Biapical pleural-parenchymal

scarring. No pleural effusion or pneumothorax.  Tracheomegaly.



HEART AND MEDIASTINUM: Multiple small nodules in the thyroid. No mediastinal,

hilar or axillary lymphadenopathy.  The heart is normal in size. There is no

pericardial effusion.  Triple vessel coronary artery calcific atherosclerosis.

Aberrant right subclavian. Calcifications of the thoracic aorta and origins of

its major branches.] Hyparterial right mainstem bronchus.



UPPER ABDOMEN: Unremarkable.



BONES: Minimally displaced fractures of right ribs 5 and 6. Nondisplaced

fractures of posterior aspects of right ribs 7-9



SOFT TISSUES: Left chest wall cardiac device with leads in the right atrium and

right ventricle. Small amount of extrapleural intercostal hemorrhage and air.



IMPRESSION:



Minimally displaced fractures of right ribs 5 and 6 and nondisplaced fractures

of right ribs 7-9 with minimal extrapleural hematoma and air. Nondisplaced

fractures of posterior aspects of right ribs 7-9.

Triple vessel coronary artery calcific atherosclerosis.

Multinodular thyroid. 



Signed by: Rey Pena DO on 10/6/2019 1:08 AM

## 2020-11-21 ENCOUNTER — HOSPITAL ENCOUNTER (OUTPATIENT)
Dept: HOSPITAL 88 - FSED | Age: 85
Setting detail: OBSERVATION
LOS: 2 days | Discharge: HOME | End: 2020-11-23
Attending: INTERNAL MEDICINE | Admitting: INTERNAL MEDICINE
Payer: MEDICARE

## 2020-11-21 VITALS — WEIGHT: 163 LBS | BODY MASS INDEX: 26.2 KG/M2 | HEIGHT: 66 IN

## 2020-11-21 VITALS — DIASTOLIC BLOOD PRESSURE: 66 MMHG | SYSTOLIC BLOOD PRESSURE: 138 MMHG

## 2020-11-21 VITALS — SYSTOLIC BLOOD PRESSURE: 138 MMHG | DIASTOLIC BLOOD PRESSURE: 66 MMHG

## 2020-11-21 DIAGNOSIS — C50.919: ICD-10-CM

## 2020-11-21 DIAGNOSIS — I12.9: ICD-10-CM

## 2020-11-21 DIAGNOSIS — Z95.0: ICD-10-CM

## 2020-11-21 DIAGNOSIS — D62: ICD-10-CM
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
## 2020-11-21 DIAGNOSIS — Z20.828: ICD-10-CM

## 2020-11-21 DIAGNOSIS — N18.30: ICD-10-CM

## 2020-11-21 DIAGNOSIS — C64.9: ICD-10-CM

## 2020-11-21 DIAGNOSIS — D50.9: ICD-10-CM

## 2020-11-21 DIAGNOSIS — J47.9: ICD-10-CM

## 2020-11-21 DIAGNOSIS — S70.01XA: Primary | ICD-10-CM

## 2020-11-21 LAB
ALBUMIN SERPL-MCNC: 3.1 G/DL (ref 3.5–5)
ALBUMIN/GLOB SERPL: 1.1 {RATIO} (ref 0.8–2)
ALP SERPL-CCNC: 49 IU/L (ref 40–150)
ALT SERPL-CCNC: 15 IU/L (ref 0–55)
ANION GAP SERPL CALC-SCNC: 14.3 MMOL/L (ref 8–16)
BASOPHILS # BLD AUTO: 0.1 10*3/UL (ref 0–0.1)
BASOPHILS NFR BLD AUTO: 0.4 % (ref 0–1)
BUN SERPL-MCNC: 28 MG/DL (ref 7–26)
BUN/CREAT SERPL: 21 (ref 6–25)
CALCIUM SERPL-MCNC: 8.1 MG/DL (ref 8.4–10.2)
CHLORIDE SERPL-SCNC: 104 MMOL/L (ref 98–107)
CK MB SERPL-MCNC: 2.2 NG/ML (ref 0–5)
CK SERPL-CCNC: 155 IU/L (ref 29–168)
CO2 SERPL-SCNC: 23 MMOL/L (ref 22–29)
DEPRECATED NEUTROPHILS # BLD AUTO: 8.2 10*3/UL (ref 2.1–6.9)
EGFRCR SERPLBLD CKD-EPI 2021: 38 ML/MIN (ref 60–?)
EOSINOPHIL # BLD AUTO: 0.1 10*3/UL (ref 0–0.4)
EOSINOPHIL NFR BLD AUTO: 1.1 % (ref 0–6)
ERYTHROCYTE [DISTWIDTH] IN CORD BLOOD: 15.7 % (ref 11.7–14.4)
GLOBULIN PLAS-MCNC: 2.8 G/DL (ref 2.3–3.5)
GLUCOSE SERPLBLD-MCNC: 127 MG/DL (ref 74–118)
HCT VFR BLD AUTO: 25 % (ref 34.2–44.1)
HGB BLD-MCNC: 7.9 G/DL (ref 12–16)
LYMPHOCYTES # BLD: 1.8 10*3/UL (ref 1–3.2)
LYMPHOCYTES NFR BLD AUTO: 16 % (ref 18–39.1)
MCH RBC QN AUTO: 24.7 PG (ref 28–32)
MCHC RBC AUTO-ENTMCNC: 31.6 G/DL (ref 31–35)
MCV RBC AUTO: 78.1 FL (ref 81–99)
MONOCYTES # BLD AUTO: 1.1 10*3/UL (ref 0.2–0.8)
MONOCYTES NFR BLD AUTO: 9.4 % (ref 4.4–11.3)
NEUTS SEG NFR BLD AUTO: 72.7 % (ref 38.7–80)
PLATELET # BLD AUTO: 217 X10E3/UL (ref 140–360)
POTASSIUM SERPL-SCNC: 4.3 MMOL/L (ref 3.5–5.1)
RBC # BLD AUTO: 3.2 X10E6/UL (ref 3.6–5.1)
SODIUM SERPL-SCNC: 137 MMOL/L (ref 136–145)

## 2020-11-21 PROCEDURE — 84484 ASSAY OF TROPONIN QUANT: CPT

## 2020-11-21 PROCEDURE — 83540 ASSAY OF IRON: CPT

## 2020-11-21 PROCEDURE — 83880 ASSAY OF NATRIURETIC PEPTIDE: CPT

## 2020-11-21 PROCEDURE — 73502 X-RAY EXAM HIP UNI 2-3 VIEWS: CPT

## 2020-11-21 PROCEDURE — 93005 ELECTROCARDIOGRAM TRACING: CPT

## 2020-11-21 PROCEDURE — 82550 ASSAY OF CK (CPK): CPT

## 2020-11-21 PROCEDURE — 71045 X-RAY EXAM CHEST 1 VIEW: CPT

## 2020-11-21 PROCEDURE — 82553 CREATINE MB FRACTION: CPT

## 2020-11-21 PROCEDURE — 80053 COMPREHEN METABOLIC PANEL: CPT

## 2020-11-21 PROCEDURE — 85025 COMPLETE CBC W/AUTO DIFF WBC: CPT

## 2020-11-21 PROCEDURE — 82948 REAGENT STRIP/BLOOD GLUCOSE: CPT

## 2020-11-21 PROCEDURE — 85610 PROTHROMBIN TIME: CPT

## 2020-11-21 PROCEDURE — 70450 CT HEAD/BRAIN W/O DYE: CPT

## 2020-11-21 PROCEDURE — 99284 EMERGENCY DEPT VISIT MOD MDM: CPT

## 2020-11-21 PROCEDURE — 99251: CPT

## 2020-11-21 PROCEDURE — 86850 RBC ANTIBODY SCREEN: CPT

## 2020-11-21 PROCEDURE — 80048 BASIC METABOLIC PNL TOTAL CA: CPT

## 2020-11-21 PROCEDURE — 84466 ASSAY OF TRANSFERRIN: CPT

## 2020-11-21 PROCEDURE — 94660 CPAP INITIATION&MGMT: CPT

## 2020-11-21 PROCEDURE — 80076 HEPATIC FUNCTION PANEL: CPT

## 2020-11-21 PROCEDURE — 86920 COMPATIBILITY TEST SPIN: CPT

## 2020-11-21 PROCEDURE — 86900 BLOOD TYPING SEROLOGIC ABO: CPT

## 2020-11-21 PROCEDURE — 97116 GAIT TRAINING THERAPY: CPT

## 2020-11-21 PROCEDURE — 36415 COLL VENOUS BLD VENIPUNCTURE: CPT

## 2020-11-21 PROCEDURE — 97161 PT EVAL LOW COMPLEX 20 MIN: CPT

## 2020-11-21 RX ADMIN — Medication PRN ML: at 16:06

## 2020-11-21 NOTE — NUR
patient is a new admit. patient is awake and talking. patient is resting in bed. bed is in 
lowest position and call light is within reach. will continue to monitor patient.

## 2020-11-21 NOTE — DIAGNOSTIC IMAGING REPORT
X-ray AP view of the pelvis. 3 views of the right hip.



HISTORY: Right hip pain status post fall.

COMPARISON: None available.

     

FINDINGS:

Bone/joints: No acute fracture or dislocation. There is chondroid  lesion in

the subtrochanteric region of the right femur.



Soft tissues: No focal soft tissue abnormality.



Others: The partially imaged lower abdomen demonstrates normal bowel gas

pattern with large stool burden throughout the colon. Multilevel degenerative

changes of the lower lumbar spine.



 IMPRESSION: 



1.  No acute fracture or dislocation.

2.  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

represents an enchondroma. However, if there is persistent right hip pain,

orthopedic referral is advised for further evaluation and to rule out the

sarcomatous transformation.



Signed by: Phyllis Julian MD on 11/21/2020 4:40 PM

## 2020-11-21 NOTE — NUR
pt states no med list, but remembers her coumadin dose and a few of her daily 
meds. updated as much as possible.

## 2020-11-21 NOTE — EMERGENCY DEPARTMENT NOTE
History of Present Illnes


History of Present Illness


Chief Complaint:  hematoma rgt hip /rgt elbow s/p trip and fall


History of Present Illness


This is a 85 year old  female.  pt is on coumadin and last inr 4 days 

ago =2.5.


Historian:  Patient


Arrival Mode:  Car


History limited by:  condition of the patient (normal)


 Required:  No


Onset (how long ago):  hour(s) (1)


Location:  see above


Quality:  sharp


Radiation:  Reports non-radiation


Severity:  moderate


Onset quality:  sudden


Duration (how long):  hour(s) (1)


Timing of current episode:  constant


Progression:  unchanged


Chronicity:  new


Context:  Reports trauma/injury


Relieving factors:  none


Exacerbating factors:  movement


Associated symptoms:  Reports malaise, Reports other (LIGHTHEADED WHEN STANDING 

UP)


Treatments prior to arrival:  none





Past Medical/Family History


Physician Review


I have reviewed the patient's past medical and family history.  Any updates have

been documented here.





Past Medical History


Recent Fever:  No


Clinical Suspicion of Infectio:  No


New/Unexplained Change in Ment:  No


Past Medical History:  Hypertension, Cancer, Hyperlipedemia


Other Medical History:  


LT KIDNEY CA,





LT BREAST CA





iron deficiency anemia


Past Surgical History:  Cholecysctectomy, Appendectomy, Hysterectomy, T&A, 

Pacer/AICD


Other Surgery:  


PACEMAKER, LT MASTECTOMY, RT UPPER LOBECTOMY, Sick Sinus Syndrome, Lt 


nephrectomy





Social History


Smoking Cessation:  Unknown if ever smoked


Counseling Performed:  No


Alcohol Use:  None


Any Illegal Drug Use:  No





Other


Last Tetanus:  2013


Any Pre-Existing Lines (PICC,:  No





Review of Systems


Review of Systems


Constitutional:  Reports no symptoms


EENTM:  Reports no symptoms


Cardiovascular:  Reports no symptoms


Respiratory:  Reports no symptoms


Gastrointestinal:  Reports no symptoms


Genitourinary:  Reports no symptoms


Musculoskeletal:  Reports no symptoms


Integumentary:  Reports as per HPI


Neurological:  Reports no symptoms


Psychological:  Reports no symptoms


Endocrine:  Reports no symptoms


Hematological/Lymphatic:  Reports no symptoms


Review of other systems:  All other systems negative





Physical Exam


Related Data


Allergies:  


Coded Allergies:  


     Quinolones (Verified  Allergy, Unknown, 18)


     alendronate sodium (Verified  Allergy, Unknown, 18)


     atorvastatin (Verified  Allergy, Unknown, 18)


     hydrocodone (Verified  Allergy, Unknown, 18)


     ofloxacin (Verified  Allergy, Unknown, 18)


     oxaprozin (Verified  Allergy, Unknown, 18)


     risedronate sodium (Verified  Allergy, Unknown, 18)


     tizanidine (Verified  Allergy, Unknown, 18)


     vancomycin (Verified  Allergy, Unknown, 18)


Triage Vital Signs





Vital Signs








  Date Time  Temp Pulse Resp B/P (MAP) Pulse Ox O2 Delivery O2 Flow Rate FiO2


 


20 14:46 98.6 70 16 142/58 100 Room Air  








Vital signs reviewed:  Yes





Physical Exam


CONSTITUTIONAL





Constitutional:  Present well-developed, Present well-nourished


HENT


HENT:  Present normocephalic, Present atraumatic, Present oropharynx 

clear/moist, Present nose normal


HENT L/R:  Present left ext ear normal, Present right ext ear normal


EYES





Eyes:  Reports PERRL, Reports conjunctivae normal


NECK


Neck:  Present ROM normal


PULMONARY


Pulmonary:  Present effort normal, Present breath sounds normal


CARDIOVASCULAR





Cardiovascular:  Present regular rhythm, Present heart sounds normal, Present 

capillary refill normal, Present normal rate


GASTROINTESTINAL





Abdominal:  Present soft, Present nontender, Present bowel sounds normal


GENITOURINARY





Genitourinary:  Present exam deferred


SKIN


Skin:  Present warm, Present dry, Present other (+hematoma rgt hip)


MUSCULOSKELETAL





Musculoskeletal:  Present ROM normal


NEUROLOGICAL





Neurological:  Present alert, Present oriented x 3, Present no gross motor or 

sensory deficits


PSYCHOLOGICAL


Psychological:  Present mood/affect normal, Present judgement normal





Results


Laboratory


Lab results reviewed:  Yes


Laboratory comments


cbc normal except hbg 9.5, inr=3.1, cmp nl except elevated glucose/elevated 

bun/cr, bnp 148, nl cardiac enzymes except myoglobin= 128





Imaging


Imaging results reviewed:  Yes


Impressions


                                        


                        [place] [address]








Patient Name: ZITA MIRANDA                          MR #: [id number]             


: 1935                         Age/Sex: 85/F


Acct #: [id number]                    Req #: [id number]


Adm Physician:                                      


Ordered by: WIL AUGUSTIN                     Report #: [id number] 


Location: [facility]                          Room/Bed:           


                                                   _____________________________


______________________________________________________________________





Procedure: [id number] HOPD/CT BRAIN WO-HOPD


Exam Date: 20                            Exam Time: 1541








                              REPORT STATUS: Signed





CT BRAIN WO-HOPD





HISTORY: Fall, weakness





COMPARISON:  Head CT 10/5/2019





Technique: 


Noncontrast axial scans were obtained from skull base to the vertex.  Coronal


and sagittal reconstructions obtained from the axial data.  One or more of the


following dose reduction techniques were used: Automated exposure control,


adjustment of the mA and/or kV according to patient size, and/or utilization of


iterative reconstruction technique.





DISCUSSION:





Scalp/Skull: Unremarkable.


Brain sulci: Mildly prominent.


Ventricles: Compensatory dilatation.


Extra-axial spaces: No masses or fluid collections. Carotid siphon vertebral


artery calcifications are present.





Parenchyma: 


Mild bilateral deep white matter hypodensity is likely chronic microvascular


ischemic change. 


Otherwise, no masses, hemorrhage, or large vascular territory acute infarct.





Dural sinuses:  No abnormal densities.


Sellar/Suprasellar region: Intact.


Skull base: Intact.


Incidental findings: None.





IMPRESSION:


1.  No acute intracranial abnormalities.


2.  Mild supratentorial chronic microvascular ischemic change. Mild generalized


cerebral volume loss.








Signed by: Dr. Attila Dickerson M.D. on 2020 3:50 PM








Dictated By: ATTILA DICKERSON MD


Electronically Signed By: ATTILA DICKERSON MD on 20


Transcribed By: OLIVIA on 20 








COPY TO:   WIL AUGUSTIN~











                                        


                        [place] [address]








Patient Name: ZITA MIRANDA                          MR #: [id number]           


  


: 1935                         Age/Sex: 85/F


Acct #: [id number]                    Req #: [id number]


Adm Physician:                                      


Ordered by: WIL AUGUSTIN                     Report #: [id number] 


Location: [facility]                          Room/Bed:           


                                                                             ___


________________________________________________________________________________


________________





Procedure: [id number] HOPD/HIP 2 VW WITH PELVIS RT - HOPD


Exam Date: 20                            Exam Time: 1544








                              REPORT STATUS: Signed





X-ray AP view of the pelvis. 3 views of the right hip.





HISTORY: Right hip pain status post fall.


COMPARISON: None available.


     


FINDINGS:


Bone/joints: No acute fracture or dislocation. There is chondroid  lesion in


the subtrochanteric region of the right femur.





Soft tissues: No focal soft tissue abnormality.





Others: The partially imaged lower abdomen demonstrates normal bowel gas


pattern with large stool burden throughout the colon. Multilevel degenerative


changes of the lower lumbar spine.





 IMPRESSION: 





1.  No acute fracture or dislocation.


2.  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


represents an enchondroma. However, if there is persistent right hip pain,


orthopedic referral is advised for further evaluation and to rule out the


sarcomatous transformation.





Signed by: Pernell Way MD on 2020 4:40 PM








Dictated By: PERNELL WAY MD


Electronically Signed By: PERNELL WAY MD on 20 1640


Transcribed By: OLIVIA on 20 1640 








COPY TO:   WIL AUGUSTIN~





Procedures


12 Lead ECG Interpretation


ECG Interpretation :  


   ECG:  ECG 1


   :  Interpreted by ED physician


   Date:  2020


   Time:  15:57


   Rhythm:  sinus rhythm (nsr)


   Rate:  normal


   BPM:  62


   Conduction:  left bundle branch block


   ST segments normal:  Yes


   T waves normal:  Yes


   Clinical Impression:  abnormal ECG


   Additional Comments


left axis deviation, lbbb





Critical Care Time


Comments


spoke to dr mix and accepted admission of pt





Assessment & Plan


Medical Decision Making


MDM


see below





Assessment & Plan


Final Impression:  


(1) Symptomatic anemia


(2) Traumatic hematoma of multiple sites


(3) Coagulopathy


(4) Dehydration


Last Vital Signs











  Date Time  Temp Pulse Resp B/P (MAP) Pulse Ox O2 Delivery O2 Flow Rate FiO2


 


20 14:46 98.6 70 16 142/58 100 Room Air  








Home Meds


Reported Medications


Warfarin Sodium (COUMADIN) 5 Mg Tablet


   20


Pravastatin Sodium (PRAVASTATIN SODIUM) 40 Mg Tablet, PO DAILY


   18


Fluticasone/Salmeterol (ADVAIR 100-50 DISKUS) 1 Each Disk.w.dev, 232 IH


   18


Dextran 70/Hypromellose (ARTIFICIAL TEARS EYE DROPS) 15 Ml Drops, 15 ML OP Q4H, 

#1 BOTTLE


   18


Anastrozole (ANASTROZOLE) 1 Mg Tablet, 1 PO DAILY


   18


Amlodipine Besylate (AMLODIPINE BESYLATE) 5 Mg Tablet, 5 MG PO DAILY, #30 TAB


   18


Aspirin (ASPIR-JAVIER) 325 Mg Tablet.dr, 81 MG PO DAILY


   1/15/14


Tiotropium Bromide (SPIRIVA) 18 Mcg Cap.w.dev, INH BID


   1/15/14


Ferrous Fumarate (IRON) 55 Mg Tablet.er, 45 MG PO BID


   1/15/14


Metoprolol Succinate (METOPROLOL SUCCINATE) 25 Mg Tab.er.24h, 50 MG PO BID


   1/15/14


Amiodarone Hcl (PACERONE) 100 Mg Tablet, 100 MG PO DAILY


   1/15/14


Discontinued Reported Medications


Cefuroxime Axetil (CEFUROXIME) 250 Mg Tablet, 250 MG PO BID for 7 Days, TAB


   18


Acetylcysteine (ACETYLCYSTEINE) 100 Mg/1 Ml Vial, 2 ML INH TID


   18


Multivitamin (MULTIVITAMINS) 1 Each Capsule, 1 TAB PO DAILY


   18


Lactobacillus Acidophilus (ACIDOPHILUS) 1 Each Tab.chew, 1 TAB PO DAILY


   18


Sennosides (SENNOSIDES) 8.6 Mg Tablet, 2 DAILY PRN for CONSTIPATION


   18


Lactobac Cmb #3/Fos/Pantethine (PROBIOTIC & ACIDOPHILUS CAP) 1 Each Capsule, 1


   18


Omega-3 Fatty Acids (OMEGA-3) 1,000 Mg Capsule, 540 MG DAILY


   18


[Nasacort I Spray]   No Conflict Check, BID PRN for ALLERGY


   18


Guaifenesin (GUAIFENESIN) 400 Mg Tablet, PO PRN for Q4HRS


   18


[H5vkhil]   No Conflict Check, 1000 PO DAILY


   18


[Calcim Citrrate  ]   No Conflict Check, 630 PO BID


   18


Loratadine/Pseudoephedrine (CLARITIN-D 24 HOUR TABLET) 1 Each Tab.er.24h, 10 

EACH PO PRN, #30 TAB


   18


Bisacodyl (BISACODYL) 5 Mg Tablet.dr, 5 MG PO, TAB


   18


Acetaminophen (ACETAMINOPHEN) 325 Mg Tablet, 500 MG PO Q6H for 5 Days, TAB


   18











WIL AUGUSTIN              2020 15:25

## 2020-11-21 NOTE — DIAGNOSTIC IMAGING REPORT
CT BRAIN [facility]



HISTORY: Fall, weakness



COMPARISON:  Head CT 10/5/2019



Technique: 

Noncontrast axial scans were obtained from skull base to the vertex.  Coronal

and sagittal reconstructions obtained from the axial data.  One or more of the

following dose reduction techniques were used: Automated exposure control,

adjustment of the mA and/or kV according to patient size, and/or utilization of

iterative reconstruction technique.



DISCUSSION:



Scalp/Skull: Unremarkable.

Brain sulci: Mildly prominent.

Ventricles: Compensatory dilatation.

Extra-axial spaces: No masses or fluid collections. Carotid siphon vertebral

artery calcifications are present.



Parenchyma: 

Mild bilateral deep white matter hypodensity is likely chronic microvascular

ischemic change. 

Otherwise, no masses, hemorrhage, or large vascular territory acute infarct.



Dural sinuses:  No abnormal densities.

Sellar/Suprasellar region: Intact.

Skull base: Intact.

Incidental findings: None.



IMPRESSION:

1.  No acute intracranial abnormalities.

2.  Mild supratentorial chronic microvascular ischemic change. Mild generalized

cerebral volume loss.





Signed by: Dr. Attila Dickerson M.D. on 11/21/2020 3:50 PM

## 2020-11-21 NOTE — NUR
Patient was getting up off of the toilet seat and lost consciousness for a couple of 
seconds. Upon regaining consciousness patient was escorted into the bed with the help of a 
wheelchair.  ER doctor was notified and came to check on patient. ER doctor ordered STAT 
head CT, STAT chest x-ray and STAT blood work. Attending physician notified via telephone. 
Patient has been instructed that a bed pan will be used for all toileting purposes from now 
on. Will continue to monitor patient.

## 2020-11-22 VITALS — SYSTOLIC BLOOD PRESSURE: 118 MMHG | DIASTOLIC BLOOD PRESSURE: 44 MMHG

## 2020-11-22 VITALS — SYSTOLIC BLOOD PRESSURE: 138 MMHG | DIASTOLIC BLOOD PRESSURE: 66 MMHG

## 2020-11-22 VITALS — SYSTOLIC BLOOD PRESSURE: 119 MMHG | DIASTOLIC BLOOD PRESSURE: 49 MMHG

## 2020-11-22 VITALS — SYSTOLIC BLOOD PRESSURE: 132 MMHG | DIASTOLIC BLOOD PRESSURE: 50 MMHG

## 2020-11-22 VITALS — DIASTOLIC BLOOD PRESSURE: 44 MMHG | SYSTOLIC BLOOD PRESSURE: 115 MMHG

## 2020-11-22 LAB
ANION GAP SERPL CALC-SCNC: 11.9 MMOL/L (ref 8–16)
BASOPHILS # BLD AUTO: 0 10*3/UL (ref 0–0.1)
BASOPHILS NFR BLD AUTO: 0.2 % (ref 0–1)
BUN SERPL-MCNC: 23 MG/DL (ref 7–26)
BUN/CREAT SERPL: 17 (ref 6–25)
CALCIUM SERPL-MCNC: 8 MG/DL (ref 8.4–10.2)
CHLORIDE SERPL-SCNC: 103 MMOL/L (ref 98–107)
CK MB SERPL-MCNC: 2.3 NG/ML (ref 0–5)
CK MB SERPL-MCNC: 2.7 NG/ML (ref 0–5)
CK SERPL-CCNC: 210 IU/L (ref 29–168)
CK SERPL-CCNC: 344 IU/L (ref 29–168)
CO2 SERPL-SCNC: 24 MMOL/L (ref 22–29)
DEPRECATED NEUTROPHILS # BLD AUTO: 6 10*3/UL (ref 2.1–6.9)
EGFRCR SERPLBLD CKD-EPI 2021: 36 ML/MIN (ref 60–?)
EOSINOPHIL # BLD AUTO: 0 10*3/UL (ref 0–0.4)
EOSINOPHIL NFR BLD AUTO: 0 % (ref 0–6)
ERYTHROCYTE [DISTWIDTH] IN CORD BLOOD: 15.9 % (ref 11.7–14.4)
GLUCOSE SERPLBLD-MCNC: 160 MG/DL (ref 74–118)
HCT VFR BLD AUTO: 24.3 % (ref 34.2–44.1)
HGB BLD-MCNC: 7.5 G/DL (ref 12–16)
IRON SATN MFR SERPL: 14 % (ref 15–50)
IRON SERPL-MCNC: 71 UG/DL (ref 50–170)
LYMPHOCYTES # BLD: 1.3 10*3/UL (ref 1–3.2)
LYMPHOCYTES NFR BLD AUTO: 16 % (ref 18–39.1)
MCH RBC QN AUTO: 24.4 PG (ref 28–32)
MCHC RBC AUTO-ENTMCNC: 30.9 G/DL (ref 31–35)
MCV RBC AUTO: 78.9 FL (ref 81–99)
MONOCYTES # BLD AUTO: 0.9 10*3/UL (ref 0.2–0.8)
MONOCYTES NFR BLD AUTO: 10.4 % (ref 4.4–11.3)
NEUTS SEG NFR BLD AUTO: 73 % (ref 38.7–80)
PLATELET # BLD AUTO: 207 X10E3/UL (ref 140–360)
POTASSIUM SERPL-SCNC: 3.9 MMOL/L (ref 3.5–5.1)
RBC # BLD AUTO: 3.08 X10E6/UL (ref 3.6–5.1)
SODIUM SERPL-SCNC: 135 MMOL/L (ref 136–145)
TIBC SERPL-MCNC: 493 UG/DL (ref 261–478)
TRANSFERRIN SERPL-MCNC: 352 MG/DL (ref 180–382)

## 2020-11-22 NOTE — HISTORY AND PHYSICAL
PRIMARY CARE DOCTOR:  Dr. Arcelia Shelton.

 

CHIEF COMPLAINT:  Fall.

 

HISTORY OF PRESENT ILLNESS:  This is an 85-year-old woman, who lives at home by herself;

however, she does have a balance issue.  She does use a cane when she walks; however,

unfortunately, she lost balance and fell.  The patient takes Coumadin for her atrial

fibrillation.  The patient fell on her right side and developed right elbow hematoma and

right hip hematoma.  As far as she knows, she did not pass out.  She did not hit her

head.  Denies chest pain.  No shortness of breath.  No nausea or vomiting. 

 

PAST MEDICAL AND SURGICAL HISTORY:  

1. Atrial fibrillation, on Coumadin.

2. Hypertension.

3. COPD with bronchiectasis.

4. Breast cancer, status post mastectomy.

5. Renal cell carcinoma, status post nephrectomy.

6. Permanent pacemaker status.

 

MEDICATIONS:  Please see medication reconciliation form.

 

ALLERGIES:  MULTIPLE, PLEASE SEE THE LIST.

 

SOCIAL HISTORY:  Quit smoking.

 

FAMILY HISTORY:  No history of cancer.

 

REVIEW OF SYSTEMS:

A 10-point review of systems obtained and nothing else is significant other than what is

stated in HPI. 

 

PHYSICAL EXAMINATION:

VITAL SIGNS:  Temperature 99.0, pulse 79, respiratory rate 22, blood pressure 118/44. 

GENERAL:  No acute distress. 

SKIN:  Hematoma of the right elbow and right hip. 

HEENT.  Sclerae anicteric.  Oropharynx is clear. 

LUNGS:  Clear. 

HEART:  Regular rate and rhythm.  Normal S1 and S2. 

GI:  Abdomen is soft, nondistended. 

NEUROLOGIC:  Alert and oriented x3.  Cranial nerves II through XII grossly intact. 

PSYCHIATRIC:  No hallucination. 

MUSCULOSKELETAL:  Painless range of motion.

LABORATORY DATA:  Creatinine 1.3.  Hemoglobin 7.5 and in the emergency room, it was 9.5.

 INR 3.1.  Head CT did not show any acute disease.  Hip and pelvic x-ray did not show

any acute fracture. 

 

ASSESSMENT AND PLAN:  

1. Mechanical fall while on Coumadin with symptomatic acute blood loss anemia.  We will

go ahead and give 1 unit of blood.  We will continue to hold Coumadin.  We will also

have physical therapist evaluate her.  We will also give her iron infusion for iron

deficiency anemia. 

2. Chronic obstructive pulmonary disease with bronchiectasis, which is stable.

3. Hypertension, stable.

4. Stage 3 chronic kidney disease, stable.

5. Gastrointestinal and deep venous thrombosis prophylaxis.  Coumadin is still in her

system. 

6. Disposition, possibly going home tomorrow if feeling better.  I have updated her

primary care doctor as well. 

 

 

 

 

______________________________

MD CHARLES Kumar/THELMA

D:  11/22/2020 19:00:12

T:  11/22/2020 20:23:42

Job #:  [id number]

 

cc:            [facility]

## 2020-11-22 NOTE — DIAGNOSTIC IMAGING REPORT
EXAMINATION:  CHEST SINGLE (PORTABLE)    



INDICATION:      

^NEAR SYNCOPE/RAPID RESPONSE

^[id number]

^2358

^Y



COMPARISON:  CT of the chest on 10/5/2019. Chest x-ray on 12/3/2018.

     

FINDINGS:    



TUBES and LINES:  The pacemaker is intact.



LUNGS:  The lungs are well inflated.  There is diffuse prominent interstitial

lung markings . No focal consolidation.



PLEURA:  No pleural effusion or pneumothorax.



HEART AND MEDIASTINUM:  The cardiomediastinal silhouette is unremarkable. There

are atherosclerotic calcifications within the aorta.



BONES AND SOFT TISSUES:  Degenerative changes in the spine and shoulders.  Soft

tissues are unremarkable.



UPPER ABDOMEN: No free air under the diaphragm.    



IMPRESSION: 



1.  No acute thoracic radiographic abnormality.

2.  Prominent interstitial lung markings which are unchanged from prior

examination and likely reflect chronic lung scarring. 



Signed by: Phyllis Julian MD on 11/22/2020 7:48 AM

## 2020-11-22 NOTE — NUR
Blood transfusion started, patient tolerated the first 15 minutes with no complaints, no 
reaction. Will continue to monitor.

## 2020-11-22 NOTE — DIAGNOSTIC IMAGING REPORT
Exam: Head CT without contrast

History:  Near syncope

Comparison studies:  Head CT, 11/21/2020.



Technique:

Axial images were obtained from the skull base to the vertex.

Coronal and sagittal images reconstructed from the axial data.  Dose

modulation, iterative reconstruction, and/or weight based adjustment of the

mA/kV was utilized to reduce the radiation dose to as low as reasonably

achievable.  



Radiation dose: 

Total DLP: 921 mGy*cm. 

Estimated effective dose: DLP x 0.015 

Intravenous contrast: None



Findings:



Scalp: No abnormalities.

Bones: No fractures, blastic or lytic lesions.



Brain sulci: Mildly prominent.

Ventricles: Mild compensatory dilatation. No hydrocephalus.

Extra-axial spaces: No masses, no fluid collection. 



Parenchyma: 

No mass, acute hemorrhage or acute cortical vascular insults. 

A few scattered hypodensities in the supratentorial white matter are

nonspecific but most compatible with chronic small vessel ischemic changes.



Sellar/suprasellar region: No abnormalities.

Craniocervical junction: Patent foramen magnum. No Chiari one malformation.



Incidental findings: 

Atherosclerotic calcifications in the carotid siphons and in the right

intradural vertebral artery 



IMPRESSION:

 

1.  No acute intracranial abnormalities.

2.  No changes from the prior head CT of 11/21/2020.

3.  Mild generalized parenchymal volume loss.

4.  Mild chronic microvascular ischemic changes.



Signed by: Dr. Ge Forde M.D. on 11/22/2020 12:55 AM

## 2020-11-23 VITALS — DIASTOLIC BLOOD PRESSURE: 69 MMHG | SYSTOLIC BLOOD PRESSURE: 131 MMHG

## 2020-11-23 VITALS — SYSTOLIC BLOOD PRESSURE: 117 MMHG | DIASTOLIC BLOOD PRESSURE: 71 MMHG

## 2020-11-23 VITALS — SYSTOLIC BLOOD PRESSURE: 131 MMHG | DIASTOLIC BLOOD PRESSURE: 69 MMHG

## 2020-11-23 VITALS — SYSTOLIC BLOOD PRESSURE: 126 MMHG | DIASTOLIC BLOOD PRESSURE: 60 MMHG

## 2020-11-23 VITALS — DIASTOLIC BLOOD PRESSURE: 71 MMHG | SYSTOLIC BLOOD PRESSURE: 110 MMHG

## 2020-11-23 VITALS — DIASTOLIC BLOOD PRESSURE: 55 MMHG | SYSTOLIC BLOOD PRESSURE: 121 MMHG

## 2020-11-23 LAB
ANION GAP SERPL CALC-SCNC: 12.9 MMOL/L (ref 8–16)
BASOPHILS # BLD AUTO: 0 10*3/UL (ref 0–0.1)
BASOPHILS NFR BLD AUTO: 0.3 % (ref 0–1)
BUN SERPL-MCNC: 19 MG/DL (ref 7–26)
BUN/CREAT SERPL: 16 (ref 6–25)
CALCIUM SERPL-MCNC: 7.9 MG/DL (ref 8.4–10.2)
CHLORIDE SERPL-SCNC: 104 MMOL/L (ref 98–107)
CO2 SERPL-SCNC: 24 MMOL/L (ref 22–29)
DEPRECATED INR PLAS: 2.46
DEPRECATED NEUTROPHILS # BLD AUTO: 4.2 10*3/UL (ref 2.1–6.9)
EGFRCR SERPLBLD CKD-EPI 2021: 42 ML/MIN (ref 60–?)
EOSINOPHIL # BLD AUTO: 0 10*3/UL (ref 0–0.4)
EOSINOPHIL NFR BLD AUTO: 0.5 % (ref 0–6)
ERYTHROCYTE [DISTWIDTH] IN CORD BLOOD: 16.6 % (ref 11.7–14.4)
GLUCOSE SERPLBLD-MCNC: 112 MG/DL (ref 74–118)
HCT VFR BLD AUTO: 23.9 % (ref 34.2–44.1)
HGB BLD-MCNC: 7.8 G/DL (ref 12–16)
LYMPHOCYTES # BLD: 1.3 10*3/UL (ref 1–3.2)
LYMPHOCYTES NFR BLD AUTO: 20 % (ref 18–39.1)
MCH RBC QN AUTO: 25.7 PG (ref 28–32)
MCHC RBC AUTO-ENTMCNC: 32.6 G/DL (ref 31–35)
MCV RBC AUTO: 78.9 FL (ref 81–99)
MONOCYTES # BLD AUTO: 1 10*3/UL (ref 0.2–0.8)
MONOCYTES NFR BLD AUTO: 14.9 % (ref 4.4–11.3)
NEUTS SEG NFR BLD AUTO: 64 % (ref 38.7–80)
PLATELET # BLD AUTO: 160 X10E3/UL (ref 140–360)
POTASSIUM SERPL-SCNC: 3.9 MMOL/L (ref 3.5–5.1)
PROTHROMBIN TIME: 27.8 SECONDS (ref 11.9–14.5)
RBC # BLD AUTO: 3.03 X10E6/UL (ref 3.6–5.1)
SODIUM SERPL-SCNC: 137 MMOL/L (ref 136–145)

## 2020-11-23 RX ADMIN — Medication PRN ML: at 08:52

## 2020-11-23 NOTE — DISCHARGE SUMMARY
PRIMARY CARE PHYSICIAN:  Dr. Arcelia Shelton.

 

FINAL DIAGNOSIS:  Acute blood loss anemia due to mechanical fall while on Coumadin with

right hip hematoma. 

 

SECONDARY DIAGNOSES:  

1. Atrial fibrillation, on Coumadin.

2. COPD with bronchiectasis.

3. Hypertension.

4. Stage 3 chronic kidney disease.

5. Breast cancer.

6. Renal cell carcinoma.

 

CONSULTANTS:  None.

 

PROCEDURE/STUDIES PERFORMED:  Head CT.

 

HISTORY:  Per dictated H and P.

 

HOSPITAL COURSE:  The patient was admitted.  The patient was symptomatic; therefore, 1

unit of blood was transfused.  Symptomatically, the patient had felt much better

afterwards.  The patient was able to ambulate 300 feet with the physical therapist.  I

have asked her to hold her Coumadin for five days then restart at her usual dosage.  The

patient also received iron infusion for her iron deficiency anemia.  I have updated her

primary care doctor about this hospitalization.  The patient was seen and examined

today. 

 

CONDITION ON DISCHARGE:  Improved.

 

DISCHARGE MEDICATIONS:  Please see medication reconciliation form.

 

 

 

 

______________________________

MD CHARLES Kumar/THELMA

D:  11/23/2020 21:53:48

T:  11/23/2020 23:23:26

Job #:  [id number]

## 2021-04-15 ENCOUNTER — HOSPITAL ENCOUNTER (INPATIENT)
Dept: HOSPITAL 88 - FSED | Age: 86
LOS: 3 days | Discharge: HOME | DRG: 204 | End: 2021-04-18
Attending: INTERNAL MEDICINE | Admitting: INTERNAL MEDICINE
Payer: MEDICARE

## 2021-04-15 VITALS — DIASTOLIC BLOOD PRESSURE: 85 MMHG | SYSTOLIC BLOOD PRESSURE: 123 MMHG

## 2021-04-15 VITALS — BODY MASS INDEX: 21.2 KG/M2 | HEIGHT: 65 IN | WEIGHT: 127.25 LBS

## 2021-04-15 VITALS — SYSTOLIC BLOOD PRESSURE: 123 MMHG | DIASTOLIC BLOOD PRESSURE: 85 MMHG

## 2021-04-15 DIAGNOSIS — R04.2: Primary | ICD-10-CM

## 2021-04-15 DIAGNOSIS — R91.1: ICD-10-CM

## 2021-04-15 DIAGNOSIS — Z20.822: ICD-10-CM

## 2021-04-15 DIAGNOSIS — S27.818A: ICD-10-CM

## 2021-04-15 DIAGNOSIS — Z91.048: ICD-10-CM

## 2021-04-15 DIAGNOSIS — B96.20: ICD-10-CM

## 2021-04-15 DIAGNOSIS — Z86.11: ICD-10-CM

## 2021-04-15 DIAGNOSIS — I13.0: ICD-10-CM

## 2021-04-15 DIAGNOSIS — I50.32: ICD-10-CM

## 2021-04-15 DIAGNOSIS — Z88.8: ICD-10-CM

## 2021-04-15 DIAGNOSIS — Z88.1: ICD-10-CM

## 2021-04-15 DIAGNOSIS — Z79.01: ICD-10-CM

## 2021-04-15 DIAGNOSIS — N18.30: ICD-10-CM

## 2021-04-15 DIAGNOSIS — Z85.528: ICD-10-CM

## 2021-04-15 DIAGNOSIS — E78.5: ICD-10-CM

## 2021-04-15 DIAGNOSIS — D68.32: ICD-10-CM

## 2021-04-15 DIAGNOSIS — Z85.3: ICD-10-CM

## 2021-04-15 DIAGNOSIS — T45.515A: ICD-10-CM

## 2021-04-15 DIAGNOSIS — J20.9: ICD-10-CM

## 2021-04-15 DIAGNOSIS — Z90.5: ICD-10-CM

## 2021-04-15 DIAGNOSIS — J44.0: ICD-10-CM

## 2021-04-15 DIAGNOSIS — I48.20: ICD-10-CM

## 2021-04-15 DIAGNOSIS — Z95.0: ICD-10-CM

## 2021-04-15 LAB
CK MB SERPL-MCNC: 2.8 NG/ML (ref 0–5)
CK SERPL-CCNC: 269 IU/L (ref 29–168)

## 2021-04-15 PROCEDURE — 85025 COMPLETE CBC W/AUTO DIFF WBC: CPT

## 2021-04-15 PROCEDURE — 36415 COLL VENOUS BLD VENIPUNCTURE: CPT

## 2021-04-15 PROCEDURE — 80053 COMPREHEN METABOLIC PANEL: CPT

## 2021-04-15 PROCEDURE — 81003 URINALYSIS AUTO W/O SCOPE: CPT

## 2021-04-15 PROCEDURE — 87040 BLOOD CULTURE FOR BACTERIA: CPT

## 2021-04-15 PROCEDURE — 94660 CPAP INITIATION&MGMT: CPT

## 2021-04-15 PROCEDURE — 87070 CULTURE OTHR SPECIMN AEROBIC: CPT

## 2021-04-15 PROCEDURE — 71250 CT THORAX DX C-: CPT

## 2021-04-15 PROCEDURE — 82553 CREATINE MB FRACTION: CPT

## 2021-04-15 PROCEDURE — 87205 SMEAR GRAM STAIN: CPT

## 2021-04-15 PROCEDURE — 84484 ASSAY OF TROPONIN QUANT: CPT

## 2021-04-15 PROCEDURE — 93005 ELECTROCARDIOGRAM TRACING: CPT

## 2021-04-15 PROCEDURE — 83605 ASSAY OF LACTIC ACID: CPT

## 2021-04-15 PROCEDURE — 83880 ASSAY OF NATRIURETIC PEPTIDE: CPT

## 2021-04-15 PROCEDURE — 82550 ASSAY OF CK (CPK): CPT

## 2021-04-15 PROCEDURE — 85610 PROTHROMBIN TIME: CPT

## 2021-04-15 PROCEDURE — 80048 BASIC METABOLIC PNL TOTAL CA: CPT

## 2021-04-15 PROCEDURE — 99284 EMERGENCY DEPT VISIT MOD MDM: CPT

## 2021-04-15 PROCEDURE — 87186 SC STD MICRODIL/AGAR DIL: CPT

## 2021-04-15 PROCEDURE — 94640 AIRWAY INHALATION TREATMENT: CPT

## 2021-04-15 PROCEDURE — 71046 X-RAY EXAM CHEST 2 VIEWS: CPT

## 2021-04-16 VITALS — DIASTOLIC BLOOD PRESSURE: 49 MMHG | SYSTOLIC BLOOD PRESSURE: 113 MMHG

## 2021-04-16 VITALS — SYSTOLIC BLOOD PRESSURE: 116 MMHG | DIASTOLIC BLOOD PRESSURE: 56 MMHG

## 2021-04-16 VITALS — DIASTOLIC BLOOD PRESSURE: 56 MMHG | SYSTOLIC BLOOD PRESSURE: 135 MMHG

## 2021-04-16 VITALS — SYSTOLIC BLOOD PRESSURE: 106 MMHG | DIASTOLIC BLOOD PRESSURE: 58 MMHG

## 2021-04-16 VITALS — SYSTOLIC BLOOD PRESSURE: 137 MMHG | DIASTOLIC BLOOD PRESSURE: 77 MMHG

## 2021-04-16 VITALS — DIASTOLIC BLOOD PRESSURE: 68 MMHG | SYSTOLIC BLOOD PRESSURE: 121 MMHG

## 2021-04-16 VITALS — DIASTOLIC BLOOD PRESSURE: 58 MMHG | SYSTOLIC BLOOD PRESSURE: 106 MMHG

## 2021-04-16 LAB
ANION GAP SERPL CALC-SCNC: 18 MMOL/L (ref 8–16)
BASOPHILS # BLD AUTO: 0 10*3/UL (ref 0–0.1)
BASOPHILS NFR BLD AUTO: 0.4 % (ref 0–1)
BUN SERPL-MCNC: 15 MG/DL (ref 7–26)
BUN/CREAT SERPL: 13 (ref 6–25)
CALCIUM SERPL-MCNC: 8 MG/DL (ref 8.4–10.2)
CHLORIDE SERPL-SCNC: 101 MMOL/L (ref 98–107)
CK MB SERPL-MCNC: 2 NG/ML (ref 0–5)
CK SERPL-CCNC: 220 IU/L (ref 29–168)
CO2 SERPL-SCNC: 23 MMOL/L (ref 22–29)
DEPRECATED INR PLAS: 2.26
DEPRECATED NEUTROPHILS # BLD AUTO: 6 10*3/UL (ref 2.1–6.9)
EGFRCR SERPLBLD CKD-EPI 2021: 45 ML/MIN (ref 60–?)
EOSINOPHIL # BLD AUTO: 0 10*3/UL (ref 0–0.4)
EOSINOPHIL NFR BLD AUTO: 0.4 % (ref 0–6)
ERYTHROCYTE [DISTWIDTH] IN CORD BLOOD: 13.3 % (ref 11.7–14.4)
GLUCOSE SERPLBLD-MCNC: 98 MG/DL (ref 74–118)
HCT VFR BLD AUTO: 32.4 % (ref 34.2–44.1)
HGB BLD-MCNC: 10.8 G/DL (ref 12–16)
LYMPHOCYTES # BLD: 1.2 10*3/UL (ref 1–3.2)
LYMPHOCYTES NFR BLD AUTO: 14.1 % (ref 18–39.1)
MCH RBC QN AUTO: 29.3 PG (ref 28–32)
MCHC RBC AUTO-ENTMCNC: 33.3 G/DL (ref 31–35)
MCV RBC AUTO: 88 FL (ref 81–99)
MONOCYTES # BLD AUTO: 1.2 10*3/UL (ref 0.2–0.8)
MONOCYTES NFR BLD AUTO: 13.8 % (ref 4.4–11.3)
NEUTS SEG NFR BLD AUTO: 71.1 % (ref 38.7–80)
PLATELET # BLD AUTO: 155 X10E3/UL (ref 140–360)
POTASSIUM SERPL-SCNC: 4 MMOL/L (ref 3.5–5.1)
PROTHROMBIN TIME: 25.7 SECONDS (ref 11.9–14.5)
RBC # BLD AUTO: 3.68 X10E6/UL (ref 3.6–5.1)
SODIUM SERPL-SCNC: 138 MMOL/L (ref 136–145)

## 2021-04-16 RX ADMIN — Medication SCH MG: at 09:39

## 2021-04-16 RX ADMIN — METOPROLOL SUCCINATE SCH MG: 25 TABLET, EXTENDED RELEASE ORAL at 16:52

## 2021-04-16 RX ADMIN — ASPIRIN 81 MG CHEWABLE TABLET SCH MG: 81 TABLET CHEWABLE at 09:37

## 2021-04-16 RX ADMIN — MONTELUKAST SODIUM SCH MG: 10 TABLET, FILM COATED ORAL at 09:39

## 2021-04-16 RX ADMIN — FLUTICASONE PROPIONATE AND SALMETEROL SCH EA: 50; 100 POWDER RESPIRATORY (INHALATION) at 10:26

## 2021-04-16 RX ADMIN — FLUTICASONE PROPIONATE AND SALMETEROL SCH EA: 50; 100 POWDER RESPIRATORY (INHALATION) at 18:08

## 2021-04-16 RX ADMIN — Medication SCH TAB: at 09:39

## 2021-04-16 RX ADMIN — AMLODIPINE BESYLATE SCH MG: 5 TABLET ORAL at 09:42

## 2021-04-16 RX ADMIN — SODIUM CHLORIDE SCH MLS/HR: 9 INJECTION, SOLUTION INTRAVENOUS at 15:05

## 2021-04-16 RX ADMIN — BISACODYL SCH MG: 5 TABLET, COATED ORAL at 09:38

## 2021-04-16 RX ADMIN — BISACODYL SCH MG: 5 TABLET, COATED ORAL at 16:52

## 2021-04-16 RX ADMIN — MONTELUKAST SODIUM SCH MG: 10 TABLET, FILM COATED ORAL at 09:00

## 2021-04-16 RX ADMIN — METOPROLOL SUCCINATE SCH MG: 25 TABLET, EXTENDED RELEASE ORAL at 09:40

## 2021-04-16 RX ADMIN — Medication SCH MG: at 09:38

## 2021-04-17 VITALS — SYSTOLIC BLOOD PRESSURE: 116 MMHG | DIASTOLIC BLOOD PRESSURE: 55 MMHG

## 2021-04-17 VITALS — SYSTOLIC BLOOD PRESSURE: 130 MMHG | DIASTOLIC BLOOD PRESSURE: 58 MMHG

## 2021-04-17 VITALS — DIASTOLIC BLOOD PRESSURE: 57 MMHG | SYSTOLIC BLOOD PRESSURE: 110 MMHG

## 2021-04-17 VITALS — DIASTOLIC BLOOD PRESSURE: 58 MMHG | SYSTOLIC BLOOD PRESSURE: 130 MMHG

## 2021-04-17 VITALS — SYSTOLIC BLOOD PRESSURE: 114 MMHG | DIASTOLIC BLOOD PRESSURE: 51 MMHG

## 2021-04-17 VITALS — SYSTOLIC BLOOD PRESSURE: 119 MMHG | DIASTOLIC BLOOD PRESSURE: 59 MMHG

## 2021-04-17 VITALS — SYSTOLIC BLOOD PRESSURE: 126 MMHG | DIASTOLIC BLOOD PRESSURE: 64 MMHG

## 2021-04-17 VITALS — DIASTOLIC BLOOD PRESSURE: 64 MMHG | SYSTOLIC BLOOD PRESSURE: 126 MMHG

## 2021-04-17 LAB
ALBUMIN SERPL-MCNC: 2.8 G/DL (ref 3.5–5)
ALBUMIN/GLOB SERPL: 1 {RATIO} (ref 0.8–2)
ALP SERPL-CCNC: 62 IU/L (ref 40–150)
ALT SERPL-CCNC: 22 IU/L (ref 0–55)
ANION GAP SERPL CALC-SCNC: 13 MMOL/L (ref 8–16)
BASOPHILS # BLD AUTO: 0 10*3/UL (ref 0–0.1)
BASOPHILS NFR BLD AUTO: 0.6 % (ref 0–1)
BUN SERPL-MCNC: 15 MG/DL (ref 7–26)
BUN/CREAT SERPL: 14 (ref 6–25)
CALCIUM SERPL-MCNC: 7.9 MG/DL (ref 8.4–10.2)
CHLORIDE SERPL-SCNC: 104 MMOL/L (ref 98–107)
CO2 SERPL-SCNC: 25 MMOL/L (ref 22–29)
DEPRECATED INR PLAS: 2.22
DEPRECATED NEUTROPHILS # BLD AUTO: 3.1 10*3/UL (ref 2.1–6.9)
EGFRCR SERPLBLD CKD-EPI 2021: 47 ML/MIN (ref 60–?)
EOSINOPHIL # BLD AUTO: 0.1 10*3/UL (ref 0–0.4)
EOSINOPHIL NFR BLD AUTO: 0.9 % (ref 0–6)
ERYTHROCYTE [DISTWIDTH] IN CORD BLOOD: 13.2 % (ref 11.7–14.4)
GLOBULIN PLAS-MCNC: 2.9 G/DL (ref 2.3–3.5)
GLUCOSE SERPLBLD-MCNC: 103 MG/DL (ref 74–118)
HCT VFR BLD AUTO: 30.9 % (ref 34.2–44.1)
HGB BLD-MCNC: 10.2 G/DL (ref 12–16)
LYMPHOCYTES # BLD: 1.1 10*3/UL (ref 1–3.2)
LYMPHOCYTES NFR BLD AUTO: 21.1 % (ref 18–39.1)
MCH RBC QN AUTO: 29.1 PG (ref 28–32)
MCHC RBC AUTO-ENTMCNC: 33 G/DL (ref 31–35)
MCV RBC AUTO: 88.3 FL (ref 81–99)
MONOCYTES # BLD AUTO: 0.9 10*3/UL (ref 0.2–0.8)
MONOCYTES NFR BLD AUTO: 17.7 % (ref 4.4–11.3)
NEUTS SEG NFR BLD AUTO: 59.3 % (ref 38.7–80)
PLATELET # BLD AUTO: 147 X10E3/UL (ref 140–360)
POTASSIUM SERPL-SCNC: 4 MMOL/L (ref 3.5–5.1)
PROTHROMBIN TIME: 25.4 SECONDS (ref 11.9–14.5)
RBC # BLD AUTO: 3.5 X10E6/UL (ref 3.6–5.1)
SODIUM SERPL-SCNC: 138 MMOL/L (ref 136–145)

## 2021-04-17 RX ADMIN — FLUTICASONE PROPIONATE AND SALMETEROL SCH EA: 50; 100 POWDER RESPIRATORY (INHALATION) at 20:45

## 2021-04-17 RX ADMIN — TIOTROPIUM BROMIDE SCH MCG: 18 CAPSULE ORAL; RESPIRATORY (INHALATION) at 07:38

## 2021-04-17 RX ADMIN — AMLODIPINE BESYLATE SCH MG: 5 TABLET ORAL at 10:11

## 2021-04-17 RX ADMIN — FLUTICASONE PROPIONATE AND SALMETEROL SCH EA: 50; 100 POWDER RESPIRATORY (INHALATION) at 07:38

## 2021-04-17 RX ADMIN — SODIUM CHLORIDE SCH MLS/HR: 9 INJECTION, SOLUTION INTRAVENOUS at 14:06

## 2021-04-17 RX ADMIN — Medication SCH MG: at 10:11

## 2021-04-17 RX ADMIN — Medication SCH TAB: at 10:11

## 2021-04-17 RX ADMIN — Medication SCH MG: at 09:00

## 2021-04-17 RX ADMIN — METOPROLOL SUCCINATE SCH MG: 25 TABLET, EXTENDED RELEASE ORAL at 17:01

## 2021-04-17 RX ADMIN — BISACODYL SCH MG: 5 TABLET, COATED ORAL at 17:01

## 2021-04-17 RX ADMIN — ASPIRIN 81 MG CHEWABLE TABLET SCH MG: 81 TABLET CHEWABLE at 10:10

## 2021-04-17 RX ADMIN — BISACODYL SCH MG: 5 TABLET, COATED ORAL at 10:10

## 2021-04-17 RX ADMIN — METOPROLOL SUCCINATE SCH MG: 25 TABLET, EXTENDED RELEASE ORAL at 10:11

## 2021-04-17 RX ADMIN — MONTELUKAST SODIUM SCH MG: 10 TABLET, FILM COATED ORAL at 10:11

## 2021-04-18 VITALS — DIASTOLIC BLOOD PRESSURE: 45 MMHG | SYSTOLIC BLOOD PRESSURE: 114 MMHG

## 2021-04-18 VITALS — SYSTOLIC BLOOD PRESSURE: 119 MMHG | DIASTOLIC BLOOD PRESSURE: 55 MMHG

## 2021-04-18 VITALS — DIASTOLIC BLOOD PRESSURE: 55 MMHG | SYSTOLIC BLOOD PRESSURE: 119 MMHG

## 2021-04-18 VITALS — SYSTOLIC BLOOD PRESSURE: 117 MMHG | DIASTOLIC BLOOD PRESSURE: 67 MMHG

## 2021-04-18 LAB
ALBUMIN SERPL-MCNC: 2.9 G/DL (ref 3.5–5)
ALBUMIN/GLOB SERPL: 0.9 {RATIO} (ref 0.8–2)
ALP SERPL-CCNC: 59 IU/L (ref 40–150)
ALT SERPL-CCNC: 22 IU/L (ref 0–55)
ANION GAP SERPL CALC-SCNC: 10.3 MMOL/L (ref 8–16)
BASOPHILS # BLD AUTO: 0 10*3/UL (ref 0–0.1)
BASOPHILS NFR BLD AUTO: 0.7 % (ref 0–1)
BUN SERPL-MCNC: 18 MG/DL (ref 7–26)
BUN/CREAT SERPL: 15 (ref 6–25)
CALCIUM SERPL-MCNC: 8 MG/DL (ref 8.4–10.2)
CHLORIDE SERPL-SCNC: 105 MMOL/L (ref 98–107)
CO2 SERPL-SCNC: 27 MMOL/L (ref 22–29)
DEPRECATED NEUTROPHILS # BLD AUTO: 3.5 10*3/UL (ref 2.1–6.9)
EGFRCR SERPLBLD CKD-EPI 2021: 43 ML/MIN (ref 60–?)
EOSINOPHIL # BLD AUTO: 0.1 10*3/UL (ref 0–0.4)
EOSINOPHIL NFR BLD AUTO: 0.9 % (ref 0–6)
ERYTHROCYTE [DISTWIDTH] IN CORD BLOOD: 13.5 % (ref 11.7–14.4)
GLOBULIN PLAS-MCNC: 3.1 G/DL (ref 2.3–3.5)
GLUCOSE SERPLBLD-MCNC: 98 MG/DL (ref 74–118)
HCT VFR BLD AUTO: 32.4 % (ref 34.2–44.1)
HGB BLD-MCNC: 10.5 G/DL (ref 12–16)
LYMPHOCYTES # BLD: 1.2 10*3/UL (ref 1–3.2)
LYMPHOCYTES NFR BLD AUTO: 20.4 % (ref 18–39.1)
MCH RBC QN AUTO: 29 PG (ref 28–32)
MCHC RBC AUTO-ENTMCNC: 32.4 G/DL (ref 31–35)
MCV RBC AUTO: 89.5 FL (ref 81–99)
MONOCYTES # BLD AUTO: 0.9 10*3/UL (ref 0.2–0.8)
MONOCYTES NFR BLD AUTO: 16 % (ref 4.4–11.3)
NEUTS SEG NFR BLD AUTO: 61.8 % (ref 38.7–80)
PLATELET # BLD AUTO: 184 X10E3/UL (ref 140–360)
POTASSIUM SERPL-SCNC: 4.3 MMOL/L (ref 3.5–5.1)
RBC # BLD AUTO: 3.62 X10E6/UL (ref 3.6–5.1)
SODIUM SERPL-SCNC: 138 MMOL/L (ref 136–145)

## 2021-04-18 RX ADMIN — MONTELUKAST SODIUM SCH MG: 10 TABLET, FILM COATED ORAL at 07:55

## 2021-04-18 RX ADMIN — Medication SCH MG: at 07:55

## 2021-04-18 RX ADMIN — TIOTROPIUM BROMIDE SCH MCG: 18 CAPSULE ORAL; RESPIRATORY (INHALATION) at 08:05

## 2021-04-18 RX ADMIN — METOPROLOL SUCCINATE SCH MG: 25 TABLET, EXTENDED RELEASE ORAL at 07:56

## 2021-04-18 RX ADMIN — BISACODYL SCH MG: 5 TABLET, COATED ORAL at 07:55

## 2021-04-18 RX ADMIN — Medication SCH TAB: at 07:55

## 2021-04-18 RX ADMIN — AMLODIPINE BESYLATE SCH MG: 5 TABLET ORAL at 07:55

## 2021-04-18 RX ADMIN — FLUTICASONE PROPIONATE AND SALMETEROL SCH EA: 50; 100 POWDER RESPIRATORY (INHALATION) at 08:05

## 2021-04-18 RX ADMIN — SODIUM CHLORIDE SCH MLS/HR: 9 INJECTION, SOLUTION INTRAVENOUS at 13:00

## 2021-04-18 RX ADMIN — ASPIRIN 81 MG CHEWABLE TABLET SCH MG: 81 TABLET CHEWABLE at 07:55

## 2021-05-18 ENCOUNTER — HOSPITAL ENCOUNTER (INPATIENT)
Dept: HOSPITAL 88 - ER | Age: 86
LOS: 14 days | Discharge: HOME HEALTH | DRG: 871 | End: 2021-06-01
Attending: INTERNAL MEDICINE | Admitting: INTERNAL MEDICINE
Payer: MEDICARE

## 2021-05-18 VITALS — SYSTOLIC BLOOD PRESSURE: 126 MMHG | DIASTOLIC BLOOD PRESSURE: 60 MMHG

## 2021-05-18 VITALS — DIASTOLIC BLOOD PRESSURE: 62 MMHG | SYSTOLIC BLOOD PRESSURE: 133 MMHG

## 2021-05-18 VITALS — WEIGHT: 124.19 LBS | HEIGHT: 65 IN | BODY MASS INDEX: 20.69 KG/M2

## 2021-05-18 VITALS — SYSTOLIC BLOOD PRESSURE: 138 MMHG | DIASTOLIC BLOOD PRESSURE: 71 MMHG

## 2021-05-18 DIAGNOSIS — I50.22: ICD-10-CM

## 2021-05-18 DIAGNOSIS — J44.9: ICD-10-CM

## 2021-05-18 DIAGNOSIS — Z90.13: ICD-10-CM

## 2021-05-18 DIAGNOSIS — R04.2: ICD-10-CM

## 2021-05-18 DIAGNOSIS — K20.90: ICD-10-CM

## 2021-05-18 DIAGNOSIS — A41.9: Primary | ICD-10-CM

## 2021-05-18 DIAGNOSIS — D62: ICD-10-CM

## 2021-05-18 DIAGNOSIS — Z95.0: ICD-10-CM

## 2021-05-18 DIAGNOSIS — J69.0: ICD-10-CM

## 2021-05-18 DIAGNOSIS — I35.0: ICD-10-CM

## 2021-05-18 DIAGNOSIS — I11.0: ICD-10-CM

## 2021-05-18 DIAGNOSIS — Z79.01: ICD-10-CM

## 2021-05-18 DIAGNOSIS — J38.5: ICD-10-CM

## 2021-05-18 DIAGNOSIS — Z90.5: ICD-10-CM

## 2021-05-18 DIAGNOSIS — E87.1: ICD-10-CM

## 2021-05-18 DIAGNOSIS — J38.2: ICD-10-CM

## 2021-05-18 DIAGNOSIS — K29.70: ICD-10-CM

## 2021-05-18 DIAGNOSIS — Z91.81: ICD-10-CM

## 2021-05-18 DIAGNOSIS — K21.9: ICD-10-CM

## 2021-05-18 DIAGNOSIS — R91.8: ICD-10-CM

## 2021-05-18 DIAGNOSIS — J34.2: ICD-10-CM

## 2021-05-18 DIAGNOSIS — D68.32: ICD-10-CM

## 2021-05-18 DIAGNOSIS — Z86.11: ICD-10-CM

## 2021-05-18 DIAGNOSIS — T45.515A: ICD-10-CM

## 2021-05-18 DIAGNOSIS — I48.0: ICD-10-CM

## 2021-05-18 DIAGNOSIS — Z85.528: ICD-10-CM

## 2021-05-18 LAB
ALBUMIN SERPL-MCNC: 3.6 G/DL (ref 3.5–5)
ALBUMIN/GLOB SERPL: 1.3 {RATIO} (ref 0.8–2)
ALP SERPL-CCNC: 54 IU/L (ref 40–150)
ALT SERPL-CCNC: 22 IU/L (ref 0–55)
ANION GAP SERPL CALC-SCNC: 15.2 MMOL/L (ref 8–16)
BACTERIA URNS QL MICRO: (no result) /HPF
BASOPHILS # BLD AUTO: 0 10*3/UL (ref 0–0.1)
BASOPHILS # BLD AUTO: 0 10*3/UL (ref 0–0.1)
BASOPHILS NFR BLD AUTO: 0.4 % (ref 0–1)
BASOPHILS NFR BLD AUTO: 0.4 % (ref 0–1)
BUN SERPL-MCNC: 23 MG/DL (ref 7–26)
BUN/CREAT SERPL: 21 (ref 6–25)
CALCIUM SERPL-MCNC: 8.5 MG/DL (ref 8.4–10.2)
CHLORIDE SERPL-SCNC: 101 MMOL/L (ref 98–107)
CK MB SERPL-MCNC: 1.9 NG/ML (ref 0–5)
CK SERPL-CCNC: 143 IU/L (ref 29–168)
CLARITY UR: CLEAR
CO2 SERPL-SCNC: 24 MMOL/L (ref 22–29)
COLOR UR: YELLOW
DEPRECATED INR PLAS: 3.02
DEPRECATED INR PLAS: 3.42
DEPRECATED NEUTROPHILS # BLD AUTO: 5.1 10*3/UL (ref 2.1–6.9)
DEPRECATED NEUTROPHILS # BLD AUTO: 6.4 10*3/UL (ref 2.1–6.9)
DEPRECATED RBC URNS MANUAL-ACNC: (no result) /HPF (ref 0–5)
EGFRCR SERPLBLD CKD-EPI 2021: 47 ML/MIN (ref 60–?)
EOSINOPHIL # BLD AUTO: 0 10*3/UL (ref 0–0.4)
EOSINOPHIL # BLD AUTO: 0 10*3/UL (ref 0–0.4)
EOSINOPHIL NFR BLD AUTO: 0.1 % (ref 0–6)
EOSINOPHIL NFR BLD AUTO: 0.2 % (ref 0–6)
EPI CELLS URNS QL MICRO: (no result) /LPF
ERYTHROCYTE [DISTWIDTH] IN CORD BLOOD: 13.5 % (ref 11.7–14.4)
ERYTHROCYTE [DISTWIDTH] IN CORD BLOOD: 14.8 % (ref 11.7–14.4)
GLOBULIN PLAS-MCNC: 2.8 G/DL (ref 2.3–3.5)
GLUCOSE SERPLBLD-MCNC: 123 MG/DL (ref 74–118)
HCT VFR BLD AUTO: 21.8 % (ref 34.2–44.1)
HCT VFR BLD AUTO: 27.6 % (ref 34.2–44.1)
HGB BLD-MCNC: 7.1 G/DL (ref 12–16)
HGB BLD-MCNC: 9.1 G/DL (ref 12–16)
KETONES UR QL STRIP.AUTO: NEGATIVE
LEUKOCYTE ESTERASE UR QL STRIP.AUTO: NEGATIVE
LYMPHOCYTES # BLD: 0.9 10*3/UL (ref 1–3.2)
LYMPHOCYTES # BLD: 1.3 10*3/UL (ref 1–3.2)
LYMPHOCYTES NFR BLD AUTO: 12.3 % (ref 18–39.1)
LYMPHOCYTES NFR BLD AUTO: 14.4 % (ref 18–39.1)
MCH RBC QN AUTO: 28.8 PG (ref 28–32)
MCH RBC QN AUTO: 28.9 PG (ref 28–32)
MCHC RBC AUTO-ENTMCNC: 32.6 G/DL (ref 31–35)
MCHC RBC AUTO-ENTMCNC: 33 G/DL (ref 31–35)
MCV RBC AUTO: 87.3 FL (ref 81–99)
MCV RBC AUTO: 88.6 FL (ref 81–99)
MONOCYTES # BLD AUTO: 0.9 10*3/UL (ref 0.2–0.8)
MONOCYTES # BLD AUTO: 1.2 10*3/UL (ref 0.2–0.8)
MONOCYTES NFR BLD AUTO: 13 % (ref 4.4–11.3)
MONOCYTES NFR BLD AUTO: 13 % (ref 4.4–11.3)
NEUTS SEG NFR BLD AUTO: 71.7 % (ref 38.7–80)
NEUTS SEG NFR BLD AUTO: 73.9 % (ref 38.7–80)
NITRITE UR QL STRIP.AUTO: NEGATIVE
PLATELET # BLD AUTO: 136 X10E3/UL (ref 140–360)
PLATELET # BLD AUTO: 156 X10E3/UL (ref 140–360)
POTASSIUM SERPL-SCNC: 4.2 MMOL/L (ref 3.5–5.1)
PROT UR QL STRIP.AUTO: (no result)
PROTHROMBIN TIME: 32.1 SECONDS (ref 11.9–14.5)
PROTHROMBIN TIME: 35.3 SECONDS (ref 11.9–14.5)
RBC # BLD AUTO: 2.46 X10E6/UL (ref 3.6–5.1)
RBC # BLD AUTO: 3.16 X10E6/UL (ref 3.6–5.1)
SODIUM SERPL-SCNC: 136 MMOL/L (ref 136–145)
SP GR UR STRIP: 1.02 (ref 1.01–1.02)
UROBILINOGEN UR STRIP-MCNC: 0.2 MG/DL (ref 0.2–1)
WBC #/AREA URNS HPF: (no result) /HPF (ref 0–5)

## 2021-05-18 PROCEDURE — 84466 ASSAY OF TRANSFERRIN: CPT

## 2021-05-18 PROCEDURE — 86920 COMPATIBILITY TEST SPIN: CPT

## 2021-05-18 PROCEDURE — 86900 BLOOD TYPING SEROLOGIC ABO: CPT

## 2021-05-18 PROCEDURE — 93306 TTE W/DOPPLER COMPLETE: CPT

## 2021-05-18 PROCEDURE — 85610 PROTHROMBIN TIME: CPT

## 2021-05-18 PROCEDURE — 83540 ASSAY OF IRON: CPT

## 2021-05-18 PROCEDURE — 94664 DEMO&/EVAL PT USE INHALER: CPT

## 2021-05-18 PROCEDURE — 82553 CREATINE MB FRACTION: CPT

## 2021-05-18 PROCEDURE — 94640 AIRWAY INHALATION TREATMENT: CPT

## 2021-05-18 PROCEDURE — 93005 ELECTROCARDIOGRAM TRACING: CPT

## 2021-05-18 PROCEDURE — 85018 HEMOGLOBIN: CPT

## 2021-05-18 PROCEDURE — 86850 RBC ANTIBODY SCREEN: CPT

## 2021-05-18 PROCEDURE — 82746 ASSAY OF FOLIC ACID SERUM: CPT

## 2021-05-18 PROCEDURE — 84484 ASSAY OF TROPONIN QUANT: CPT

## 2021-05-18 PROCEDURE — 80048 BASIC METABOLIC PNL TOTAL CA: CPT

## 2021-05-18 PROCEDURE — 84443 ASSAY THYROID STIM HORMONE: CPT

## 2021-05-18 PROCEDURE — 82550 ASSAY OF CK (CPK): CPT

## 2021-05-18 PROCEDURE — 85025 COMPLETE CBC W/AUTO DIFF WBC: CPT

## 2021-05-18 PROCEDURE — 43239 EGD BIOPSY SINGLE/MULTIPLE: CPT

## 2021-05-18 PROCEDURE — 88312 SPECIAL STAINS GROUP 1: CPT

## 2021-05-18 PROCEDURE — 99284 EMERGENCY DEPT VISIT MOD MDM: CPT

## 2021-05-18 PROCEDURE — 70450 CT HEAD/BRAIN W/O DYE: CPT

## 2021-05-18 PROCEDURE — 99251: CPT

## 2021-05-18 PROCEDURE — 82607 VITAMIN B-12: CPT

## 2021-05-18 PROCEDURE — 81001 URINALYSIS AUTO W/SCOPE: CPT

## 2021-05-18 PROCEDURE — 88342 IMHCHEM/IMCYTCHM 1ST ANTB: CPT

## 2021-05-18 PROCEDURE — 30233N1 TRANSFUSION OF NONAUTOLOGOUS RED BLOOD CELLS INTO PERIPHERAL VEIN, PERCUTANEOUS APPROACH: ICD-10-PCS | Performed by: INTERNAL MEDICINE

## 2021-05-18 PROCEDURE — 71046 X-RAY EXAM CHEST 2 VIEWS: CPT

## 2021-05-18 PROCEDURE — 74230 X-RAY XM SWLNG FUNCJ C+: CPT

## 2021-05-18 PROCEDURE — 85014 HEMATOCRIT: CPT

## 2021-05-18 PROCEDURE — 36415 COLL VENOUS BLD VENIPUNCTURE: CPT

## 2021-05-18 PROCEDURE — 88305 TISSUE EXAM BY PATHOLOGIST: CPT

## 2021-05-18 PROCEDURE — 83735 ASSAY OF MAGNESIUM: CPT

## 2021-05-18 PROCEDURE — 71045 X-RAY EXAM CHEST 1 VIEW: CPT

## 2021-05-18 PROCEDURE — 85045 AUTOMATED RETICULOCYTE COUNT: CPT

## 2021-05-18 PROCEDURE — 82270 OCCULT BLOOD FECES: CPT

## 2021-05-18 PROCEDURE — 80053 COMPREHEN METABOLIC PANEL: CPT

## 2021-05-18 RX ADMIN — SODIUM CHLORIDE SCH MG: 900 INJECTION INTRAVENOUS at 17:01

## 2021-05-19 VITALS — DIASTOLIC BLOOD PRESSURE: 64 MMHG | SYSTOLIC BLOOD PRESSURE: 126 MMHG

## 2021-05-19 VITALS — SYSTOLIC BLOOD PRESSURE: 136 MMHG | DIASTOLIC BLOOD PRESSURE: 70 MMHG

## 2021-05-19 VITALS — SYSTOLIC BLOOD PRESSURE: 127 MMHG | DIASTOLIC BLOOD PRESSURE: 61 MMHG

## 2021-05-19 VITALS — DIASTOLIC BLOOD PRESSURE: 72 MMHG | SYSTOLIC BLOOD PRESSURE: 139 MMHG

## 2021-05-19 VITALS — DIASTOLIC BLOOD PRESSURE: 62 MMHG | SYSTOLIC BLOOD PRESSURE: 133 MMHG

## 2021-05-19 VITALS — DIASTOLIC BLOOD PRESSURE: 63 MMHG | SYSTOLIC BLOOD PRESSURE: 134 MMHG

## 2021-05-19 LAB
ANION GAP SERPL CALC-SCNC: 11 MMOL/L (ref 8–16)
BASOPHILS # BLD AUTO: 0.1 10*3/UL (ref 0–0.1)
BASOPHILS NFR BLD AUTO: 0.7 % (ref 0–1)
BUN SERPL-MCNC: 21 MG/DL (ref 7–26)
BUN/CREAT SERPL: 19 (ref 6–25)
CALCIUM SERPL-MCNC: 8 MG/DL (ref 8.4–10.2)
CHLORIDE SERPL-SCNC: 105 MMOL/L (ref 98–107)
CO2 SERPL-SCNC: 24 MMOL/L (ref 22–29)
DEPRECATED NEUTROPHILS # BLD AUTO: 6.8 10*3/UL (ref 2.1–6.9)
EGFRCR SERPLBLD CKD-EPI 2021: 46 ML/MIN (ref 60–?)
EOSINOPHIL # BLD AUTO: 0.1 10*3/UL (ref 0–0.4)
EOSINOPHIL NFR BLD AUTO: 1.2 % (ref 0–6)
ERYTHROCYTE [DISTWIDTH] IN CORD BLOOD: 15 % (ref 11.7–14.4)
GLUCOSE SERPLBLD-MCNC: 109 MG/DL (ref 74–118)
HCT VFR BLD AUTO: 21.9 % (ref 34.2–44.1)
HCT VFR BLD AUTO: 23.8 % (ref 34.2–44.1)
HGB BLD-MCNC: 7.5 G/DL (ref 12–16)
HGB BLD-MCNC: 8 G/DL (ref 12–16)
IRON SATN MFR SERPL: 8 % (ref 15–50)
IRON SERPL-MCNC: 37 UG/DL (ref 50–170)
LYMPHOCYTES # BLD: 1.1 10*3/UL (ref 1–3.2)
LYMPHOCYTES NFR BLD AUTO: 11.2 % (ref 18–39.1)
MAGNESIUM SERPL-MCNC: 1.9 MG/DL (ref 1.3–2.1)
MCH RBC QN AUTO: 29.2 PG (ref 28–32)
MCHC RBC AUTO-ENTMCNC: 34.2 G/DL (ref 31–35)
MCV RBC AUTO: 85.2 FL (ref 81–99)
MONOCYTES # BLD AUTO: 1.8 10*3/UL (ref 0.2–0.8)
MONOCYTES NFR BLD AUTO: 17.9 % (ref 4.4–11.3)
NEUTS SEG NFR BLD AUTO: 68.3 % (ref 38.7–80)
PLATELET # BLD AUTO: 135 X10E3/UL (ref 140–360)
POTASSIUM SERPL-SCNC: 4 MMOL/L (ref 3.5–5.1)
RBC # BLD AUTO: 2.57 X10E6/UL (ref 3.6–5.1)
SODIUM SERPL-SCNC: 136 MMOL/L (ref 136–145)
TIBC SERPL-MCNC: 456 UG/DL (ref 261–478)
TRANSFERRIN SERPL-MCNC: 326 MG/DL (ref 180–382)
TSH SERPL DL<=0.005 MIU/L-ACNC: 3.95 UIU/ML (ref 0.35–4.94)

## 2021-05-19 RX ADMIN — AMIODARONE HYDROCHLORIDE SCH MG: 200 TABLET ORAL at 08:34

## 2021-05-19 RX ADMIN — SODIUM CHLORIDE SCH MG: 900 INJECTION INTRAVENOUS at 08:34

## 2021-05-19 RX ADMIN — POLYETHYLENE GLYCOL 3350 PRN GM: 17 POWDER, FOR SOLUTION ORAL at 10:15

## 2021-05-19 RX ADMIN — FLUTICASONE PROPIONATE AND SALMETEROL SCH EA: 50; 100 POWDER RESPIRATORY (INHALATION) at 19:47

## 2021-05-19 RX ADMIN — SODIUM CHLORIDE PRN MG: 900 INJECTION INTRAVENOUS at 23:42

## 2021-05-19 RX ADMIN — SODIUM CHLORIDE SCH MG: 900 INJECTION INTRAVENOUS at 16:04

## 2021-05-19 RX ADMIN — METOPROLOL SUCCINATE SCH MG: 50 TABLET, EXTENDED RELEASE ORAL at 08:34

## 2021-05-20 VITALS — DIASTOLIC BLOOD PRESSURE: 57 MMHG | SYSTOLIC BLOOD PRESSURE: 129 MMHG

## 2021-05-20 VITALS — DIASTOLIC BLOOD PRESSURE: 72 MMHG | SYSTOLIC BLOOD PRESSURE: 150 MMHG

## 2021-05-20 VITALS — DIASTOLIC BLOOD PRESSURE: 51 MMHG | SYSTOLIC BLOOD PRESSURE: 128 MMHG

## 2021-05-20 VITALS — SYSTOLIC BLOOD PRESSURE: 128 MMHG | DIASTOLIC BLOOD PRESSURE: 51 MMHG

## 2021-05-20 VITALS — DIASTOLIC BLOOD PRESSURE: 54 MMHG | SYSTOLIC BLOOD PRESSURE: 128 MMHG

## 2021-05-20 VITALS — DIASTOLIC BLOOD PRESSURE: 53 MMHG | SYSTOLIC BLOOD PRESSURE: 138 MMHG

## 2021-05-20 VITALS — SYSTOLIC BLOOD PRESSURE: 143 MMHG | DIASTOLIC BLOOD PRESSURE: 57 MMHG

## 2021-05-20 LAB
ANISOCYTOSIS BLD QL SMEAR: SLIGHT
BASOPHILS # BLD AUTO: 0 10*3/UL (ref 0–0.1)
BASOPHILS NFR BLD AUTO: 0.4 % (ref 0–1)
DEPRECATED INR PLAS: 1.89
DEPRECATED NEUTROPHILS # BLD AUTO: 7.9 10*3/UL (ref 2.1–6.9)
EOSINOPHIL # BLD AUTO: 0.1 10*3/UL (ref 0–0.4)
EOSINOPHIL NFR BLD AUTO: 0.5 % (ref 0–6)
ERYTHROCYTE [DISTWIDTH] IN CORD BLOOD: 14.9 % (ref 11.7–14.4)
HCT VFR BLD AUTO: 23.5 % (ref 34.2–44.1)
HGB BLD-MCNC: 8.1 G/DL (ref 12–16)
LYMPHOCYTES # BLD: 1 10*3/UL (ref 1–3.2)
LYMPHOCYTES NFR BLD AUTO: 9.4 % (ref 18–39.1)
LYMPHOCYTES NFR BLD MANUAL: 13 % (ref 19–48)
MCH RBC QN AUTO: 29.5 PG (ref 28–32)
MCHC RBC AUTO-ENTMCNC: 34.5 G/DL (ref 31–35)
MCV RBC AUTO: 85.5 FL (ref 81–99)
MONOCYTES # BLD AUTO: 2 10*3/UL (ref 0.2–0.8)
MONOCYTES NFR BLD AUTO: 17.8 % (ref 4.4–11.3)
MONOCYTES NFR BLD MANUAL: 15 % (ref 3.4–9)
NEUTS SEG NFR BLD AUTO: 71.1 % (ref 38.7–80)
NEUTS SEG NFR BLD MANUAL: 72 % (ref 40–74)
PLAT MORPH BLD: NORMAL
PLATELET # BLD AUTO: 185 X10E3/UL (ref 140–360)
PLATELET # BLD EST: ADEQUATE 10*3/UL
PROTHROMBIN TIME: 22.4 SECONDS (ref 11.9–14.5)
RBC # BLD AUTO: 2.75 X10E6/UL (ref 3.6–5.1)
RBC MORPH BLD: NORMAL

## 2021-05-20 RX ADMIN — Medication PRN ML: at 19:15

## 2021-05-20 RX ADMIN — POLYETHYLENE GLYCOL 3350 PRN GM: 17 POWDER, FOR SOLUTION ORAL at 14:49

## 2021-05-20 RX ADMIN — SODIUM CHLORIDE SCH MLS/HR: 9 INJECTION, SOLUTION INTRAVENOUS at 09:08

## 2021-05-20 RX ADMIN — CYANOCOBALAMIN SCH MCG: 1000 INJECTION, SOLUTION INTRAMUSCULAR at 09:08

## 2021-05-20 RX ADMIN — SODIUM CHLORIDE PRN MG: 900 INJECTION INTRAVENOUS at 11:48

## 2021-05-20 RX ADMIN — FLUTICASONE PROPIONATE AND SALMETEROL SCH EA: 50; 100 POWDER RESPIRATORY (INHALATION) at 20:00

## 2021-05-20 RX ADMIN — METOCLOPRAMIDE SCH MG: 5 INJECTION, SOLUTION INTRAMUSCULAR; INTRAVENOUS at 18:16

## 2021-05-20 RX ADMIN — Medication PRN ML: at 11:01

## 2021-05-20 RX ADMIN — Medication PRN MG: at 20:20

## 2021-05-20 RX ADMIN — Medication SCH MG: at 09:00

## 2021-05-20 RX ADMIN — METOCLOPRAMIDE SCH MG: 5 INJECTION, SOLUTION INTRAMUSCULAR; INTRAVENOUS at 11:28

## 2021-05-20 RX ADMIN — METOCLOPRAMIDE SCH MG: 5 INJECTION, SOLUTION INTRAMUSCULAR; INTRAVENOUS at 23:14

## 2021-05-20 RX ADMIN — Medication SCH MG: at 14:50

## 2021-05-20 RX ADMIN — AMIODARONE HYDROCHLORIDE SCH MG: 200 TABLET ORAL at 09:08

## 2021-05-20 RX ADMIN — BENZONATATE PRN MG: 100 CAPSULE ORAL at 06:16

## 2021-05-20 RX ADMIN — FLUTICASONE PROPIONATE AND SALMETEROL SCH EA: 50; 100 POWDER RESPIRATORY (INHALATION) at 07:05

## 2021-05-20 RX ADMIN — SODIUM CHLORIDE SCH MG: 900 INJECTION INTRAVENOUS at 09:08

## 2021-05-20 RX ADMIN — METOPROLOL SUCCINATE SCH MG: 50 TABLET, EXTENDED RELEASE ORAL at 09:08

## 2021-05-20 RX ADMIN — Medication PRN ML: at 15:15

## 2021-05-20 RX ADMIN — SODIUM CHLORIDE SCH MG: 900 INJECTION INTRAVENOUS at 16:17

## 2021-05-20 RX ADMIN — METOCLOPRAMIDE SCH MG: 5 INJECTION, SOLUTION INTRAMUSCULAR; INTRAVENOUS at 05:57

## 2021-05-21 VITALS — DIASTOLIC BLOOD PRESSURE: 63 MMHG | SYSTOLIC BLOOD PRESSURE: 129 MMHG

## 2021-05-21 VITALS — SYSTOLIC BLOOD PRESSURE: 142 MMHG | DIASTOLIC BLOOD PRESSURE: 60 MMHG

## 2021-05-21 VITALS — DIASTOLIC BLOOD PRESSURE: 64 MMHG | SYSTOLIC BLOOD PRESSURE: 135 MMHG

## 2021-05-21 VITALS — DIASTOLIC BLOOD PRESSURE: 58 MMHG | SYSTOLIC BLOOD PRESSURE: 134 MMHG

## 2021-05-21 VITALS — SYSTOLIC BLOOD PRESSURE: 119 MMHG | DIASTOLIC BLOOD PRESSURE: 59 MMHG

## 2021-05-21 VITALS — DIASTOLIC BLOOD PRESSURE: 60 MMHG | SYSTOLIC BLOOD PRESSURE: 142 MMHG

## 2021-05-21 LAB
ANION GAP SERPL CALC-SCNC: 13.8 MMOL/L (ref 8–16)
BASOPHILS # BLD AUTO: 0 10*3/UL (ref 0–0.1)
BASOPHILS NFR BLD AUTO: 0.2 % (ref 0–1)
BUN SERPL-MCNC: 12 MG/DL (ref 7–26)
BUN/CREAT SERPL: 13 (ref 6–25)
CALCIUM SERPL-MCNC: 7.9 MG/DL (ref 8.4–10.2)
CHLORIDE SERPL-SCNC: 98 MMOL/L (ref 98–107)
CO2 SERPL-SCNC: 23 MMOL/L (ref 22–29)
DEPRECATED NEUTROPHILS # BLD AUTO: 8.9 10*3/UL (ref 2.1–6.9)
EGFRCR SERPLBLD CKD-EPI 2021: 55 ML/MIN (ref 60–?)
EOSINOPHIL # BLD AUTO: 0 10*3/UL (ref 0–0.4)
EOSINOPHIL NFR BLD AUTO: 0.2 % (ref 0–6)
ERYTHROCYTE [DISTWIDTH] IN CORD BLOOD: 14.8 % (ref 11.7–14.4)
GLUCOSE SERPLBLD-MCNC: 135 MG/DL (ref 74–118)
HCT VFR BLD AUTO: 22.2 % (ref 34.2–44.1)
HGB BLD-MCNC: 7.5 G/DL (ref 12–16)
LYMPHOCYTES # BLD: 0.9 10*3/UL (ref 1–3.2)
LYMPHOCYTES NFR BLD AUTO: 7.1 % (ref 18–39.1)
MCH RBC QN AUTO: 28.5 PG (ref 28–32)
MCHC RBC AUTO-ENTMCNC: 33.8 G/DL (ref 31–35)
MCV RBC AUTO: 84.4 FL (ref 81–99)
MONOCYTES # BLD AUTO: 2.4 10*3/UL (ref 0.2–0.8)
MONOCYTES NFR BLD AUTO: 19.3 % (ref 4.4–11.3)
NEUTS SEG NFR BLD AUTO: 71.8 % (ref 38.7–80)
PLATELET # BLD AUTO: 209 X10E3/UL (ref 140–360)
POTASSIUM SERPL-SCNC: 3.8 MMOL/L (ref 3.5–5.1)
RBC # BLD AUTO: 2.63 X10E6/UL (ref 3.6–5.1)
SODIUM SERPL-SCNC: 131 MMOL/L (ref 136–145)

## 2021-05-21 PROCEDURE — 0CJS8ZZ INSPECTION OF LARYNX, VIA NATURAL OR ARTIFICIAL OPENING ENDOSCOPIC: ICD-10-PCS

## 2021-05-21 RX ADMIN — SODIUM CHLORIDE PRN MG: 900 INJECTION INTRAVENOUS at 11:20

## 2021-05-21 RX ADMIN — SALINE NASAL SPRAY SCH SPRAY: 1.5 SOLUTION NASAL at 22:00

## 2021-05-21 RX ADMIN — FLUTICASONE PROPIONATE AND SALMETEROL SCH EA: 50; 100 POWDER RESPIRATORY (INHALATION) at 07:14

## 2021-05-21 RX ADMIN — CYANOCOBALAMIN SCH MCG: 1000 INJECTION, SOLUTION INTRAMUSCULAR at 08:42

## 2021-05-21 RX ADMIN — Medication SCH MG: at 08:35

## 2021-05-21 RX ADMIN — FLUTICASONE PROPIONATE AND SALMETEROL SCH EA: 50; 100 POWDER RESPIRATORY (INHALATION) at 20:07

## 2021-05-21 RX ADMIN — METOCLOPRAMIDE SCH MG: 5 INJECTION, SOLUTION INTRAMUSCULAR; INTRAVENOUS at 05:15

## 2021-05-21 RX ADMIN — SODIUM CHLORIDE SCH MLS/HR: 9 INJECTION, SOLUTION INTRAVENOUS at 08:42

## 2021-05-21 RX ADMIN — SODIUM CHLORIDE SCH MLS/HR: 9 INJECTION, SOLUTION INTRAVENOUS at 21:46

## 2021-05-21 RX ADMIN — Medication PRN ML: at 14:18

## 2021-05-21 RX ADMIN — AMIODARONE HYDROCHLORIDE SCH MG: 200 TABLET ORAL at 08:32

## 2021-05-21 RX ADMIN — METOCLOPRAMIDE SCH MG: 5 INJECTION, SOLUTION INTRAMUSCULAR; INTRAVENOUS at 17:17

## 2021-05-21 RX ADMIN — METOPROLOL SUCCINATE SCH MG: 50 TABLET, EXTENDED RELEASE ORAL at 08:34

## 2021-05-21 RX ADMIN — Medication PRN ML: at 07:14

## 2021-05-21 RX ADMIN — Medication PRN ML: at 20:07

## 2021-05-21 RX ADMIN — SODIUM CHLORIDE SCH MG: 900 INJECTION INTRAVENOUS at 08:42

## 2021-05-21 RX ADMIN — METOCLOPRAMIDE SCH MG: 5 INJECTION, SOLUTION INTRAMUSCULAR; INTRAVENOUS at 11:20

## 2021-05-21 RX ADMIN — SALINE NASAL SPRAY SCH SPRAY: 1.5 SOLUTION NASAL at 18:00

## 2021-05-21 RX ADMIN — Medication PRN ML: at 00:10

## 2021-05-21 RX ADMIN — SODIUM CHLORIDE SCH MG: 900 INJECTION INTRAVENOUS at 17:17

## 2021-05-22 VITALS — DIASTOLIC BLOOD PRESSURE: 54 MMHG | SYSTOLIC BLOOD PRESSURE: 147 MMHG

## 2021-05-22 VITALS — DIASTOLIC BLOOD PRESSURE: 51 MMHG | SYSTOLIC BLOOD PRESSURE: 142 MMHG

## 2021-05-22 VITALS — DIASTOLIC BLOOD PRESSURE: 54 MMHG | SYSTOLIC BLOOD PRESSURE: 138 MMHG

## 2021-05-22 VITALS — SYSTOLIC BLOOD PRESSURE: 126 MMHG | DIASTOLIC BLOOD PRESSURE: 56 MMHG

## 2021-05-22 VITALS — SYSTOLIC BLOOD PRESSURE: 142 MMHG | DIASTOLIC BLOOD PRESSURE: 51 MMHG

## 2021-05-22 VITALS — SYSTOLIC BLOOD PRESSURE: 135 MMHG | DIASTOLIC BLOOD PRESSURE: 52 MMHG

## 2021-05-22 VITALS — SYSTOLIC BLOOD PRESSURE: 132 MMHG | DIASTOLIC BLOOD PRESSURE: 53 MMHG

## 2021-05-22 LAB
ALBUMIN SERPL-MCNC: 2.7 G/DL (ref 3.5–5)
ALBUMIN/GLOB SERPL: 0.9 {RATIO} (ref 0.8–2)
ALP SERPL-CCNC: 57 IU/L (ref 40–150)
ALT SERPL-CCNC: 24 IU/L (ref 0–55)
ANION GAP SERPL CALC-SCNC: 13.2 MMOL/L (ref 8–16)
BASOPHILS # BLD AUTO: 0 10*3/UL (ref 0–0.1)
BASOPHILS NFR BLD AUTO: 0.2 % (ref 0–1)
BUN SERPL-MCNC: 10 MG/DL (ref 7–26)
BUN/CREAT SERPL: 11 (ref 6–25)
CALCIUM SERPL-MCNC: 8 MG/DL (ref 8.4–10.2)
CHLORIDE SERPL-SCNC: 99 MMOL/L (ref 98–107)
CO2 SERPL-SCNC: 25 MMOL/L (ref 22–29)
DEPRECATED INR PLAS: 1.62
DEPRECATED NEUTROPHILS # BLD AUTO: 7.4 10*3/UL (ref 2.1–6.9)
EGFRCR SERPLBLD CKD-EPI 2021: 57 ML/MIN (ref 60–?)
EOSINOPHIL # BLD AUTO: 0.1 10*3/UL (ref 0–0.4)
EOSINOPHIL NFR BLD AUTO: 0.5 % (ref 0–6)
ERYTHROCYTE [DISTWIDTH] IN CORD BLOOD: 15 % (ref 11.7–14.4)
GLOBULIN PLAS-MCNC: 2.9 G/DL (ref 2.3–3.5)
GLUCOSE SERPLBLD-MCNC: 103 MG/DL (ref 74–118)
HCT VFR BLD AUTO: 22.8 % (ref 34.2–44.1)
HGB BLD-MCNC: 7.6 G/DL (ref 12–16)
LYMPHOCYTES # BLD: 0.8 10*3/UL (ref 1–3.2)
LYMPHOCYTES NFR BLD AUTO: 8.1 % (ref 18–39.1)
MCH RBC QN AUTO: 28.5 PG (ref 28–32)
MCHC RBC AUTO-ENTMCNC: 33.3 G/DL (ref 31–35)
MCV RBC AUTO: 85.4 FL (ref 81–99)
MONOCYTES # BLD AUTO: 1.8 10*3/UL (ref 0.2–0.8)
MONOCYTES NFR BLD AUTO: 17.6 % (ref 4.4–11.3)
NEUTS SEG NFR BLD AUTO: 72.7 % (ref 38.7–80)
PLATELET # BLD AUTO: 236 X10E3/UL (ref 140–360)
POTASSIUM SERPL-SCNC: 4.2 MMOL/L (ref 3.5–5.1)
PROTHROMBIN TIME: 19.9 SECONDS (ref 11.9–14.5)
RBC # BLD AUTO: 2.67 X10E6/UL (ref 3.6–5.1)
SODIUM SERPL-SCNC: 133 MMOL/L (ref 136–145)

## 2021-05-22 RX ADMIN — METOCLOPRAMIDE SCH MG: 5 INJECTION, SOLUTION INTRAMUSCULAR; INTRAVENOUS at 18:00

## 2021-05-22 RX ADMIN — SODIUM CHLORIDE SCH MLS/HR: 9 INJECTION, SOLUTION INTRAVENOUS at 06:13

## 2021-05-22 RX ADMIN — Medication PRN ML: at 06:40

## 2021-05-22 RX ADMIN — Medication SCH MG: at 09:40

## 2021-05-22 RX ADMIN — SODIUM CHLORIDE SCH MLS/HR: 9 INJECTION, SOLUTION INTRAVENOUS at 09:40

## 2021-05-22 RX ADMIN — SALINE NASAL SPRAY SCH SPRAY: 1.5 SOLUTION NASAL at 18:00

## 2021-05-22 RX ADMIN — SODIUM CHLORIDE SCH MLS/HR: 9 INJECTION, SOLUTION INTRAVENOUS at 20:03

## 2021-05-22 RX ADMIN — METOCLOPRAMIDE SCH MG: 5 INJECTION, SOLUTION INTRAMUSCULAR; INTRAVENOUS at 12:53

## 2021-05-22 RX ADMIN — Medication PRN ML: at 18:59

## 2021-05-22 RX ADMIN — SODIUM CHLORIDE SCH MLS/HR: 9 INJECTION, SOLUTION INTRAVENOUS at 13:00

## 2021-05-22 RX ADMIN — SODIUM CHLORIDE SCH MG: 900 INJECTION INTRAVENOUS at 17:16

## 2021-05-22 RX ADMIN — SALINE NASAL SPRAY SCH SPRAY: 1.5 SOLUTION NASAL at 09:52

## 2021-05-22 RX ADMIN — SALINE NASAL SPRAY SCH SPRAY: 1.5 SOLUTION NASAL at 20:04

## 2021-05-22 RX ADMIN — FLUTICASONE PROPIONATE AND SALMETEROL SCH EA: 50; 100 POWDER RESPIRATORY (INHALATION) at 20:03

## 2021-05-22 RX ADMIN — METOPROLOL SUCCINATE SCH MG: 50 TABLET, EXTENDED RELEASE ORAL at 09:41

## 2021-05-22 RX ADMIN — AMIODARONE HYDROCHLORIDE SCH MG: 200 TABLET ORAL at 09:40

## 2021-05-22 RX ADMIN — METOCLOPRAMIDE SCH MG: 5 INJECTION, SOLUTION INTRAMUSCULAR; INTRAVENOUS at 00:00

## 2021-05-22 RX ADMIN — SALINE NASAL SPRAY SCH SPRAY: 1.5 SOLUTION NASAL at 06:00

## 2021-05-22 RX ADMIN — SODIUM CHLORIDE SCH MG: 900 INJECTION INTRAVENOUS at 09:40

## 2021-05-22 RX ADMIN — FLUTICASONE PROPIONATE AND SALMETEROL SCH EA: 50; 100 POWDER RESPIRATORY (INHALATION) at 10:00

## 2021-05-22 RX ADMIN — CYANOCOBALAMIN SCH MCG: 1000 INJECTION, SOLUTION INTRAMUSCULAR at 09:40

## 2021-05-22 RX ADMIN — Medication PRN ML: at 19:59

## 2021-05-22 RX ADMIN — Medication PRN MG: at 20:04

## 2021-05-22 RX ADMIN — SALINE NASAL SPRAY SCH SPRAY: 1.5 SOLUTION NASAL at 13:48

## 2021-05-22 RX ADMIN — METOCLOPRAMIDE SCH MG: 5 INJECTION, SOLUTION INTRAMUSCULAR; INTRAVENOUS at 06:14

## 2021-05-23 VITALS — DIASTOLIC BLOOD PRESSURE: 61 MMHG | SYSTOLIC BLOOD PRESSURE: 138 MMHG

## 2021-05-23 VITALS — DIASTOLIC BLOOD PRESSURE: 56 MMHG | SYSTOLIC BLOOD PRESSURE: 141 MMHG

## 2021-05-23 VITALS — SYSTOLIC BLOOD PRESSURE: 136 MMHG | DIASTOLIC BLOOD PRESSURE: 58 MMHG

## 2021-05-23 VITALS — SYSTOLIC BLOOD PRESSURE: 145 MMHG | DIASTOLIC BLOOD PRESSURE: 75 MMHG

## 2021-05-23 VITALS — SYSTOLIC BLOOD PRESSURE: 135 MMHG | DIASTOLIC BLOOD PRESSURE: 62 MMHG

## 2021-05-23 VITALS — DIASTOLIC BLOOD PRESSURE: 58 MMHG | SYSTOLIC BLOOD PRESSURE: 136 MMHG

## 2021-05-23 VITALS — SYSTOLIC BLOOD PRESSURE: 122 MMHG | DIASTOLIC BLOOD PRESSURE: 57 MMHG

## 2021-05-23 RX ADMIN — SODIUM CHLORIDE SCH MG: 900 INJECTION INTRAVENOUS at 17:47

## 2021-05-23 RX ADMIN — POLYETHYLENE GLYCOL 3350 PRN GM: 17 POWDER, FOR SOLUTION ORAL at 09:29

## 2021-05-23 RX ADMIN — SODIUM CHLORIDE SCH MLS/HR: 9 INJECTION, SOLUTION INTRAVENOUS at 12:18

## 2021-05-23 RX ADMIN — ACETYLCYSTEINE SCH ML: 200 SOLUTION ORAL; RESPIRATORY (INHALATION) at 17:03

## 2021-05-23 RX ADMIN — ACETYLCYSTEINE SCH ML: 200 SOLUTION ORAL; RESPIRATORY (INHALATION) at 17:00

## 2021-05-23 RX ADMIN — Medication PRN ML: at 06:35

## 2021-05-23 RX ADMIN — SALINE NASAL SPRAY SCH SPRAY: 1.5 SOLUTION NASAL at 05:03

## 2021-05-23 RX ADMIN — Medication SCH ML: at 17:00

## 2021-05-23 RX ADMIN — SODIUM CHLORIDE SCH MLS/HR: 9 INJECTION, SOLUTION INTRAVENOUS at 20:57

## 2021-05-23 RX ADMIN — ACETYLCYSTEINE SCH ML: 200 SOLUTION ORAL; RESPIRATORY (INHALATION) at 15:00

## 2021-05-23 RX ADMIN — SODIUM CHLORIDE SCH MLS/HR: 9 INJECTION, SOLUTION INTRAVENOUS at 04:19

## 2021-05-23 RX ADMIN — METOCLOPRAMIDE SCH MG: 5 INJECTION, SOLUTION INTRAMUSCULAR; INTRAVENOUS at 05:03

## 2021-05-23 RX ADMIN — Medication SCH ML: at 18:04

## 2021-05-23 RX ADMIN — SALINE NASAL SPRAY SCH SPRAY: 1.5 SOLUTION NASAL at 12:18

## 2021-05-23 RX ADMIN — SALINE NASAL SPRAY SCH SPRAY: 1.5 SOLUTION NASAL at 20:58

## 2021-05-23 RX ADMIN — FLUTICASONE PROPIONATE AND SALMETEROL SCH EA: 50; 100 POWDER RESPIRATORY (INHALATION) at 10:15

## 2021-05-23 RX ADMIN — Medication SCH MG: at 09:27

## 2021-05-23 RX ADMIN — Medication SCH ML: at 17:22

## 2021-05-23 RX ADMIN — Medication SCH ML: at 17:03

## 2021-05-23 RX ADMIN — AMIODARONE HYDROCHLORIDE SCH MG: 200 TABLET ORAL at 09:27

## 2021-05-23 RX ADMIN — METOCLOPRAMIDE SCH MG: 5 INJECTION, SOLUTION INTRAMUSCULAR; INTRAVENOUS at 12:18

## 2021-05-23 RX ADMIN — Medication SCH ML: at 15:30

## 2021-05-23 RX ADMIN — SODIUM CHLORIDE SCH MLS/HR: 9 INJECTION, SOLUTION INTRAVENOUS at 09:26

## 2021-05-23 RX ADMIN — CYANOCOBALAMIN SCH MCG: 1000 INJECTION, SOLUTION INTRAMUSCULAR at 09:24

## 2021-05-23 RX ADMIN — FLUTICASONE PROPIONATE AND SALMETEROL SCH EA: 50; 100 POWDER RESPIRATORY (INHALATION) at 19:06

## 2021-05-23 RX ADMIN — SALINE NASAL SPRAY SCH SPRAY: 1.5 SOLUTION NASAL at 14:00

## 2021-05-23 RX ADMIN — METOCLOPRAMIDE SCH MG: 5 INJECTION, SOLUTION INTRAMUSCULAR; INTRAVENOUS at 23:50

## 2021-05-23 RX ADMIN — METOPROLOL SUCCINATE SCH MG: 50 TABLET, EXTENDED RELEASE ORAL at 09:27

## 2021-05-23 RX ADMIN — METOCLOPRAMIDE SCH MG: 5 INJECTION, SOLUTION INTRAMUSCULAR; INTRAVENOUS at 00:05

## 2021-05-23 RX ADMIN — METOCLOPRAMIDE SCH MG: 5 INJECTION, SOLUTION INTRAMUSCULAR; INTRAVENOUS at 17:48

## 2021-05-23 RX ADMIN — ACETYLCYSTEINE SCH ML: 200 SOLUTION ORAL; RESPIRATORY (INHALATION) at 18:04

## 2021-05-23 RX ADMIN — SALINE NASAL SPRAY SCH SPRAY: 1.5 SOLUTION NASAL at 17:48

## 2021-05-23 RX ADMIN — SODIUM CHLORIDE SCH MG: 900 INJECTION INTRAVENOUS at 09:26

## 2021-05-23 RX ADMIN — ACETYLCYSTEINE SCH ML: 200 SOLUTION ORAL; RESPIRATORY (INHALATION) at 17:23

## 2021-05-24 VITALS — DIASTOLIC BLOOD PRESSURE: 68 MMHG | SYSTOLIC BLOOD PRESSURE: 152 MMHG

## 2021-05-24 VITALS — SYSTOLIC BLOOD PRESSURE: 140 MMHG | DIASTOLIC BLOOD PRESSURE: 72 MMHG

## 2021-05-24 VITALS — DIASTOLIC BLOOD PRESSURE: 72 MMHG | SYSTOLIC BLOOD PRESSURE: 140 MMHG

## 2021-05-24 VITALS — DIASTOLIC BLOOD PRESSURE: 61 MMHG | SYSTOLIC BLOOD PRESSURE: 133 MMHG

## 2021-05-24 VITALS — DIASTOLIC BLOOD PRESSURE: 62 MMHG | SYSTOLIC BLOOD PRESSURE: 138 MMHG

## 2021-05-24 VITALS — SYSTOLIC BLOOD PRESSURE: 150 MMHG | DIASTOLIC BLOOD PRESSURE: 72 MMHG

## 2021-05-24 VITALS — SYSTOLIC BLOOD PRESSURE: 155 MMHG | DIASTOLIC BLOOD PRESSURE: 95 MMHG

## 2021-05-24 LAB
ANION GAP SERPL CALC-SCNC: 16.1 MMOL/L (ref 8–16)
BASOPHILS # BLD AUTO: 0.1 10*3/UL (ref 0–0.1)
BASOPHILS NFR BLD AUTO: 0.5 % (ref 0–1)
BUN SERPL-MCNC: 12 MG/DL (ref 7–26)
BUN/CREAT SERPL: 14 (ref 6–25)
CALCIUM SERPL-MCNC: 8.5 MG/DL (ref 8.4–10.2)
CHLORIDE SERPL-SCNC: 94 MMOL/L (ref 98–107)
CO2 SERPL-SCNC: 23 MMOL/L (ref 22–29)
DEPRECATED INR PLAS: 1.1
DEPRECATED NEUTROPHILS # BLD AUTO: 8.9 10*3/UL (ref 2.1–6.9)
EGFRCR SERPLBLD CKD-EPI 2021: > 60 ML/MIN (ref 60–?)
EOSINOPHIL # BLD AUTO: 0.1 10*3/UL (ref 0–0.4)
EOSINOPHIL NFR BLD AUTO: 0.6 % (ref 0–6)
ERYTHROCYTE [DISTWIDTH] IN CORD BLOOD: 15.5 % (ref 11.7–14.4)
GLUCOSE SERPLBLD-MCNC: 141 MG/DL (ref 74–118)
HCT VFR BLD AUTO: 26.3 % (ref 34.2–44.1)
HGB BLD-MCNC: 8.6 G/DL (ref 12–16)
LYMPHOCYTES # BLD: 0.5 10*3/UL (ref 1–3.2)
LYMPHOCYTES NFR BLD AUTO: 4.1 % (ref 18–39.1)
MCH RBC QN AUTO: 28.5 PG (ref 28–32)
MCHC RBC AUTO-ENTMCNC: 32.7 G/DL (ref 31–35)
MCV RBC AUTO: 87.1 FL (ref 81–99)
MONOCYTES # BLD AUTO: 1.4 10*3/UL (ref 0.2–0.8)
MONOCYTES NFR BLD AUTO: 12.5 % (ref 4.4–11.3)
NEUTS SEG NFR BLD AUTO: 81.6 % (ref 38.7–80)
PLATELET # BLD AUTO: 270 X10E3/UL (ref 140–360)
POTASSIUM SERPL-SCNC: 4.1 MMOL/L (ref 3.5–5.1)
PROTHROMBIN TIME: 14.8 SECONDS (ref 11.9–14.5)
RBC # BLD AUTO: 3.02 X10E6/UL (ref 3.6–5.1)
SODIUM SERPL-SCNC: 129 MMOL/L (ref 136–145)

## 2021-05-24 RX ADMIN — SALINE NASAL SPRAY SCH SPRAY: 1.5 SOLUTION NASAL at 21:51

## 2021-05-24 RX ADMIN — CYANOCOBALAMIN SCH MCG: 1000 INJECTION, SOLUTION INTRAMUSCULAR at 08:42

## 2021-05-24 RX ADMIN — METOCLOPRAMIDE SCH MG: 5 INJECTION, SOLUTION INTRAMUSCULAR; INTRAVENOUS at 05:21

## 2021-05-24 RX ADMIN — SODIUM CHLORIDE SCH MLS/HR: 9 INJECTION, SOLUTION INTRAVENOUS at 10:29

## 2021-05-24 RX ADMIN — AMIODARONE HYDROCHLORIDE SCH MG: 200 TABLET ORAL at 08:38

## 2021-05-24 RX ADMIN — Medication SCH MG: at 08:37

## 2021-05-24 RX ADMIN — SODIUM CHLORIDE SCH MG: 900 INJECTION INTRAVENOUS at 16:44

## 2021-05-24 RX ADMIN — Medication SCH ML: at 07:35

## 2021-05-24 RX ADMIN — BENZONATATE PRN MG: 100 CAPSULE ORAL at 21:53

## 2021-05-24 RX ADMIN — SALINE NASAL SPRAY SCH SPRAY: 1.5 SOLUTION NASAL at 08:39

## 2021-05-24 RX ADMIN — Medication SCH ML: at 20:05

## 2021-05-24 RX ADMIN — SALINE NASAL SPRAY SCH SPRAY: 1.5 SOLUTION NASAL at 14:35

## 2021-05-24 RX ADMIN — Medication PRN MG: at 21:53

## 2021-05-24 RX ADMIN — Medication SCH ML: at 14:10

## 2021-05-24 RX ADMIN — ACETYLCYSTEINE SCH ML: 200 SOLUTION ORAL; RESPIRATORY (INHALATION) at 00:00

## 2021-05-24 RX ADMIN — FLUTICASONE PROPIONATE AND SALMETEROL SCH EA: 50; 100 POWDER RESPIRATORY (INHALATION) at 07:35

## 2021-05-24 RX ADMIN — METOCLOPRAMIDE SCH MG: 5 INJECTION, SOLUTION INTRAMUSCULAR; INTRAVENOUS at 17:04

## 2021-05-24 RX ADMIN — SALINE NASAL SPRAY SCH SPRAY: 1.5 SOLUTION NASAL at 17:04

## 2021-05-24 RX ADMIN — METHYLPREDNISOLONE SODIUM SUCCINATE SCH MG: 40 INJECTION, POWDER, LYOPHILIZED, FOR SOLUTION INTRAMUSCULAR; INTRAVENOUS at 16:38

## 2021-05-24 RX ADMIN — SODIUM CHLORIDE SCH MLS/HR: 9 INJECTION, SOLUTION INTRAVENOUS at 04:19

## 2021-05-24 RX ADMIN — METOCLOPRAMIDE SCH MG: 5 INJECTION, SOLUTION INTRAMUSCULAR; INTRAVENOUS at 12:31

## 2021-05-24 RX ADMIN — ACETYLCYSTEINE SCH ML: 200 SOLUTION ORAL; RESPIRATORY (INHALATION) at 14:20

## 2021-05-24 RX ADMIN — ACETYLCYSTEINE SCH ML: 200 SOLUTION ORAL; RESPIRATORY (INHALATION) at 20:05

## 2021-05-24 RX ADMIN — SALINE NASAL SPRAY SCH SPRAY: 1.5 SOLUTION NASAL at 05:21

## 2021-05-24 RX ADMIN — FLUTICASONE PROPIONATE AND SALMETEROL SCH EA: 50; 100 POWDER RESPIRATORY (INHALATION) at 20:04

## 2021-05-24 RX ADMIN — Medication SCH ML: at 00:00

## 2021-05-24 RX ADMIN — METOPROLOL SUCCINATE SCH MG: 50 TABLET, EXTENDED RELEASE ORAL at 08:38

## 2021-05-24 RX ADMIN — Medication PRN MG: at 00:03

## 2021-05-24 RX ADMIN — METHYLPREDNISOLONE SODIUM SUCCINATE SCH MG: 40 INJECTION, POWDER, LYOPHILIZED, FOR SOLUTION INTRAMUSCULAR; INTRAVENOUS at 21:51

## 2021-05-24 RX ADMIN — ACETYLCYSTEINE SCH ML: 200 SOLUTION ORAL; RESPIRATORY (INHALATION) at 07:35

## 2021-05-24 RX ADMIN — SODIUM CHLORIDE SCH MLS/HR: 9 INJECTION, SOLUTION INTRAVENOUS at 12:26

## 2021-05-24 RX ADMIN — SODIUM CHLORIDE SCH MG: 900 INJECTION INTRAVENOUS at 08:37

## 2021-05-24 RX ADMIN — SODIUM CHLORIDE SCH MLS/HR: 9 INJECTION, SOLUTION INTRAVENOUS at 21:51

## 2021-05-25 VITALS — SYSTOLIC BLOOD PRESSURE: 123 MMHG | DIASTOLIC BLOOD PRESSURE: 64 MMHG

## 2021-05-25 VITALS — SYSTOLIC BLOOD PRESSURE: 146 MMHG | DIASTOLIC BLOOD PRESSURE: 76 MMHG

## 2021-05-25 VITALS — SYSTOLIC BLOOD PRESSURE: 143 MMHG | DIASTOLIC BLOOD PRESSURE: 70 MMHG

## 2021-05-25 VITALS — SYSTOLIC BLOOD PRESSURE: 136 MMHG | DIASTOLIC BLOOD PRESSURE: 68 MMHG

## 2021-05-25 VITALS — DIASTOLIC BLOOD PRESSURE: 82 MMHG | SYSTOLIC BLOOD PRESSURE: 127 MMHG

## 2021-05-25 VITALS — SYSTOLIC BLOOD PRESSURE: 140 MMHG | DIASTOLIC BLOOD PRESSURE: 71 MMHG

## 2021-05-25 VITALS — SYSTOLIC BLOOD PRESSURE: 155 MMHG | DIASTOLIC BLOOD PRESSURE: 79 MMHG

## 2021-05-25 VITALS — DIASTOLIC BLOOD PRESSURE: 68 MMHG | SYSTOLIC BLOOD PRESSURE: 136 MMHG

## 2021-05-25 LAB
ANION GAP SERPL CALC-SCNC: 13.3 MMOL/L (ref 8–16)
ANISOCYTOSIS BLD QL SMEAR: SLIGHT
BASOPHILS # BLD AUTO: 0 10*3/UL (ref 0–0.1)
BASOPHILS NFR BLD AUTO: 0.1 % (ref 0–1)
BUN SERPL-MCNC: 13 MG/DL (ref 7–26)
BUN/CREAT SERPL: 16 (ref 6–25)
CALCIUM SERPL-MCNC: 8.4 MG/DL (ref 8.4–10.2)
CHLORIDE SERPL-SCNC: 98 MMOL/L (ref 98–107)
CO2 SERPL-SCNC: 25 MMOL/L (ref 22–29)
DEPRECATED NEUTROPHILS # BLD AUTO: 6.9 10*3/UL (ref 2.1–6.9)
EGFRCR SERPLBLD CKD-EPI 2021: > 60 ML/MIN (ref 60–?)
ELLIPTOCYTES BLD QL SMEAR: SLIGHT
EOSINOPHIL # BLD AUTO: 0 10*3/UL (ref 0–0.4)
EOSINOPHIL NFR BLD AUTO: 0 % (ref 0–6)
ERYTHROCYTE [DISTWIDTH] IN CORD BLOOD: 15.5 % (ref 11.7–14.4)
GLUCOSE SERPLBLD-MCNC: 183 MG/DL (ref 74–118)
HCT VFR BLD AUTO: 23.6 % (ref 34.2–44.1)
HGB BLD-MCNC: 7.7 G/DL (ref 12–16)
LYMPHOCYTES # BLD: 0.2 10*3/UL (ref 1–3.2)
LYMPHOCYTES NFR BLD AUTO: 2.9 % (ref 18–39.1)
LYMPHOCYTES NFR BLD MANUAL: 1 % (ref 19–48)
MCH RBC QN AUTO: 28.2 PG (ref 28–32)
MCHC RBC AUTO-ENTMCNC: 32.6 G/DL (ref 31–35)
MCV RBC AUTO: 86.4 FL (ref 81–99)
MONOCYTES # BLD AUTO: 0.2 10*3/UL (ref 0.2–0.8)
MONOCYTES NFR BLD AUTO: 2 % (ref 4.4–11.3)
MONOCYTES NFR BLD MANUAL: 2 % (ref 3.4–9)
MYELOCYTES NFR BLD MANUAL: 1 % (ref 0–0)
NEUTS HYPERSEG BLD QL SMEAR: (no result)
NEUTS SEG NFR BLD AUTO: 94.3 % (ref 38.7–80)
NEUTS SEG NFR BLD MANUAL: 96 % (ref 40–74)
OVALOCYTES BLD QL SMEAR: (no result)
PLAT MORPH BLD: NORMAL
PLATELET # BLD AUTO: 300 X10E3/UL (ref 140–360)
PLATELET # BLD EST: ADEQUATE 10*3/UL
POTASSIUM SERPL-SCNC: 4.3 MMOL/L (ref 3.5–5.1)
RBC # BLD AUTO: 2.73 X10E6/UL (ref 3.6–5.1)
RBC MORPH BLD: (no result)
SODIUM SERPL-SCNC: 132 MMOL/L (ref 136–145)

## 2021-05-25 RX ADMIN — METOCLOPRAMIDE SCH MG: 5 INJECTION, SOLUTION INTRAMUSCULAR; INTRAVENOUS at 16:43

## 2021-05-25 RX ADMIN — SALINE NASAL SPRAY SCH SPRAY: 1.5 SOLUTION NASAL at 21:05

## 2021-05-25 RX ADMIN — FLUTICASONE PROPIONATE AND SALMETEROL SCH EA: 50; 100 POWDER RESPIRATORY (INHALATION) at 07:40

## 2021-05-25 RX ADMIN — SALINE NASAL SPRAY SCH SPRAY: 1.5 SOLUTION NASAL at 09:22

## 2021-05-25 RX ADMIN — SALINE NASAL SPRAY SCH SPRAY: 1.5 SOLUTION NASAL at 05:21

## 2021-05-25 RX ADMIN — SODIUM CHLORIDE SCH MG: 900 INJECTION INTRAVENOUS at 16:43

## 2021-05-25 RX ADMIN — Medication SCH ML: at 00:05

## 2021-05-25 RX ADMIN — ACETYLCYSTEINE SCH ML: 200 SOLUTION ORAL; RESPIRATORY (INHALATION) at 07:35

## 2021-05-25 RX ADMIN — SODIUM CHLORIDE SCH MLS/HR: 9 INJECTION, SOLUTION INTRAVENOUS at 09:47

## 2021-05-25 RX ADMIN — METOCLOPRAMIDE SCH MG: 5 INJECTION, SOLUTION INTRAMUSCULAR; INTRAVENOUS at 05:21

## 2021-05-25 RX ADMIN — METOCLOPRAMIDE SCH MG: 5 INJECTION, SOLUTION INTRAMUSCULAR; INTRAVENOUS at 00:53

## 2021-05-25 RX ADMIN — ACETYLCYSTEINE SCH ML: 200 SOLUTION ORAL; RESPIRATORY (INHALATION) at 19:05

## 2021-05-25 RX ADMIN — SALINE NASAL SPRAY SCH SPRAY: 1.5 SOLUTION NASAL at 16:43

## 2021-05-25 RX ADMIN — SODIUM CHLORIDE SCH MLS/HR: 9 INJECTION, SOLUTION INTRAVENOUS at 13:08

## 2021-05-25 RX ADMIN — METOPROLOL SUCCINATE SCH MG: 50 TABLET, EXTENDED RELEASE ORAL at 08:57

## 2021-05-25 RX ADMIN — METOCLOPRAMIDE SCH MG: 5 INJECTION, SOLUTION INTRAMUSCULAR; INTRAVENOUS at 13:08

## 2021-05-25 RX ADMIN — ACETYLCYSTEINE SCH ML: 200 SOLUTION ORAL; RESPIRATORY (INHALATION) at 00:05

## 2021-05-25 RX ADMIN — SODIUM CHLORIDE SCH MLS/HR: 9 INJECTION, SOLUTION INTRAVENOUS at 21:05

## 2021-05-25 RX ADMIN — SODIUM CHLORIDE SCH MLS/HR: 9 INJECTION, SOLUTION INTRAVENOUS at 05:21

## 2021-05-25 RX ADMIN — METHYLPREDNISOLONE SODIUM SUCCINATE SCH MG: 40 INJECTION, POWDER, LYOPHILIZED, FOR SOLUTION INTRAMUSCULAR; INTRAVENOUS at 09:46

## 2021-05-25 RX ADMIN — Medication SCH ML: at 13:00

## 2021-05-25 RX ADMIN — METHYLPREDNISOLONE SODIUM SUCCINATE SCH MG: 40 INJECTION, POWDER, LYOPHILIZED, FOR SOLUTION INTRAMUSCULAR; INTRAVENOUS at 21:05

## 2021-05-25 RX ADMIN — CYANOCOBALAMIN SCH MCG: 1000 INJECTION, SOLUTION INTRAMUSCULAR at 08:55

## 2021-05-25 RX ADMIN — AMIODARONE HYDROCHLORIDE SCH MG: 200 TABLET ORAL at 08:56

## 2021-05-25 RX ADMIN — METOCLOPRAMIDE SCH MG: 5 INJECTION, SOLUTION INTRAMUSCULAR; INTRAVENOUS at 23:50

## 2021-05-25 RX ADMIN — ACETYLCYSTEINE SCH ML: 200 SOLUTION ORAL; RESPIRATORY (INHALATION) at 13:00

## 2021-05-25 RX ADMIN — Medication SCH MG: at 08:56

## 2021-05-25 RX ADMIN — SODIUM CHLORIDE SCH MG: 900 INJECTION INTRAVENOUS at 08:55

## 2021-05-25 RX ADMIN — Medication SCH ML: at 07:35

## 2021-05-25 RX ADMIN — Medication SCH ML: at 19:05

## 2021-05-25 RX ADMIN — SALINE NASAL SPRAY SCH SPRAY: 1.5 SOLUTION NASAL at 13:08

## 2021-05-26 VITALS — DIASTOLIC BLOOD PRESSURE: 63 MMHG | SYSTOLIC BLOOD PRESSURE: 135 MMHG

## 2021-05-26 VITALS — DIASTOLIC BLOOD PRESSURE: 71 MMHG | SYSTOLIC BLOOD PRESSURE: 134 MMHG

## 2021-05-26 VITALS — SYSTOLIC BLOOD PRESSURE: 125 MMHG | DIASTOLIC BLOOD PRESSURE: 56 MMHG

## 2021-05-26 VITALS — DIASTOLIC BLOOD PRESSURE: 75 MMHG | SYSTOLIC BLOOD PRESSURE: 140 MMHG

## 2021-05-26 VITALS — DIASTOLIC BLOOD PRESSURE: 66 MMHG | SYSTOLIC BLOOD PRESSURE: 124 MMHG

## 2021-05-26 VITALS — DIASTOLIC BLOOD PRESSURE: 66 MMHG | SYSTOLIC BLOOD PRESSURE: 128 MMHG

## 2021-05-26 LAB
ANION GAP SERPL CALC-SCNC: 11.5 MMOL/L (ref 8–16)
ANISOCYTOSIS BLD QL SMEAR: SLIGHT
BASOPHILS # BLD AUTO: 0 10*3/UL (ref 0–0.1)
BASOPHILS NFR BLD AUTO: 0.1 % (ref 0–1)
BUN SERPL-MCNC: 23 MG/DL (ref 7–26)
BUN/CREAT SERPL: 27 (ref 6–25)
CALCIUM SERPL-MCNC: 8.5 MG/DL (ref 8.4–10.2)
CHLORIDE SERPL-SCNC: 101 MMOL/L (ref 98–107)
CO2 SERPL-SCNC: 27 MMOL/L (ref 22–29)
DEPRECATED NEUTROPHILS # BLD AUTO: 18.6 10*3/UL (ref 2.1–6.9)
EGFRCR SERPLBLD CKD-EPI 2021: > 60 ML/MIN (ref 60–?)
EOSINOPHIL # BLD AUTO: 0 10*3/UL (ref 0–0.4)
EOSINOPHIL NFR BLD AUTO: 0 % (ref 0–6)
ERYTHROCYTE [DISTWIDTH] IN CORD BLOOD: 16.2 % (ref 11.7–14.4)
GLUCOSE SERPLBLD-MCNC: 173 MG/DL (ref 74–118)
HCT VFR BLD AUTO: 24.2 % (ref 34.2–44.1)
HGB BLD-MCNC: 7.9 G/DL (ref 12–16)
LYMPHOCYTES # BLD: 0.3 10*3/UL (ref 1–3.2)
LYMPHOCYTES NFR BLD AUTO: 1.5 % (ref 18–39.1)
LYMPHOCYTES NFR BLD MANUAL: 1 % (ref 19–48)
MACROCYTES BLD QL SMEAR: SLIGHT
MCH RBC QN AUTO: 28.8 PG (ref 28–32)
MCHC RBC AUTO-ENTMCNC: 32.6 G/DL (ref 31–35)
MCV RBC AUTO: 88.3 FL (ref 81–99)
MONOCYTES # BLD AUTO: 0.8 10*3/UL (ref 0.2–0.8)
MONOCYTES NFR BLD AUTO: 4.2 % (ref 4.4–11.3)
MONOCYTES NFR BLD MANUAL: 4 % (ref 3.4–9)
NEUTS SEG NFR BLD AUTO: 93.4 % (ref 38.7–80)
NEUTS SEG NFR BLD MANUAL: 95 % (ref 40–74)
PLAT MORPH BLD: NORMAL
PLATELET # BLD AUTO: 350 X10E3/UL (ref 140–360)
PLATELET # BLD EST: ADEQUATE 10*3/UL
POLYCHROMASIA BLD QL SMEAR: (no result)
POTASSIUM SERPL-SCNC: 4.5 MMOL/L (ref 3.5–5.1)
RBC # BLD AUTO: 2.74 X10E6/UL (ref 3.6–5.1)
RBC MORPH BLD: (no result)
SODIUM SERPL-SCNC: 135 MMOL/L (ref 136–145)

## 2021-05-26 RX ADMIN — Medication SCH MG: at 09:41

## 2021-05-26 RX ADMIN — SALINE NASAL SPRAY SCH SPRAY: 1.5 SOLUTION NASAL at 15:20

## 2021-05-26 RX ADMIN — Medication SCH ML: at 07:20

## 2021-05-26 RX ADMIN — CYANOCOBALAMIN SCH MCG: 1000 INJECTION, SOLUTION INTRAMUSCULAR at 09:41

## 2021-05-26 RX ADMIN — SODIUM CHLORIDE SCH MG: 900 INJECTION INTRAVENOUS at 09:41

## 2021-05-26 RX ADMIN — ACETYLCYSTEINE SCH ML: 200 SOLUTION ORAL; RESPIRATORY (INHALATION) at 02:10

## 2021-05-26 RX ADMIN — SALINE NASAL SPRAY SCH SPRAY: 1.5 SOLUTION NASAL at 04:59

## 2021-05-26 RX ADMIN — ACETYLCYSTEINE SCH ML: 200 SOLUTION ORAL; RESPIRATORY (INHALATION) at 19:20

## 2021-05-26 RX ADMIN — ACETYLCYSTEINE SCH ML: 200 SOLUTION ORAL; RESPIRATORY (INHALATION) at 13:00

## 2021-05-26 RX ADMIN — Medication SCH ML: at 13:00

## 2021-05-26 RX ADMIN — ACETYLCYSTEINE SCH ML: 200 SOLUTION ORAL; RESPIRATORY (INHALATION) at 07:20

## 2021-05-26 RX ADMIN — FLUTICASONE PROPIONATE AND SALMETEROL SCH EA: 50; 100 POWDER RESPIRATORY (INHALATION) at 07:00

## 2021-05-26 RX ADMIN — SODIUM CHLORIDE SCH MLS/HR: 9 INJECTION, SOLUTION INTRAVENOUS at 04:59

## 2021-05-26 RX ADMIN — SODIUM CHLORIDE SCH MLS/HR: 9 INJECTION, SOLUTION INTRAVENOUS at 13:11

## 2021-05-26 RX ADMIN — SALINE NASAL SPRAY SCH SPRAY: 1.5 SOLUTION NASAL at 17:43

## 2021-05-26 RX ADMIN — METOCLOPRAMIDE SCH MG: 5 INJECTION, SOLUTION INTRAMUSCULAR; INTRAVENOUS at 04:59

## 2021-05-26 RX ADMIN — SALINE NASAL SPRAY SCH SPRAY: 1.5 SOLUTION NASAL at 22:28

## 2021-05-26 RX ADMIN — FLUTICASONE PROPIONATE AND SALMETEROL SCH EA: 50; 100 POWDER RESPIRATORY (INHALATION) at 02:09

## 2021-05-26 RX ADMIN — METOCLOPRAMIDE SCH MG: 5 INJECTION, SOLUTION INTRAMUSCULAR; INTRAVENOUS at 13:11

## 2021-05-26 RX ADMIN — SODIUM CHLORIDE SCH MLS/HR: 9 INJECTION, SOLUTION INTRAVENOUS at 09:41

## 2021-05-26 RX ADMIN — SALINE NASAL SPRAY SCH SPRAY: 1.5 SOLUTION NASAL at 09:43

## 2021-05-26 RX ADMIN — AMIODARONE HYDROCHLORIDE SCH MG: 200 TABLET ORAL at 09:41

## 2021-05-26 RX ADMIN — FLUTICASONE PROPIONATE AND SALMETEROL SCH EA: 50; 100 POWDER RESPIRATORY (INHALATION) at 19:20

## 2021-05-26 RX ADMIN — METOPROLOL SUCCINATE SCH MG: 50 TABLET, EXTENDED RELEASE ORAL at 09:42

## 2021-05-26 RX ADMIN — Medication SCH ML: at 02:10

## 2021-05-26 RX ADMIN — METOCLOPRAMIDE SCH MG: 5 INJECTION, SOLUTION INTRAMUSCULAR; INTRAVENOUS at 17:43

## 2021-05-26 RX ADMIN — SODIUM CHLORIDE SCH MG: 900 INJECTION INTRAVENOUS at 17:43

## 2021-05-26 RX ADMIN — Medication SCH ML: at 19:20

## 2021-05-26 RX ADMIN — SODIUM CHLORIDE SCH MLS/HR: 9 INJECTION, SOLUTION INTRAVENOUS at 22:27

## 2021-05-27 VITALS — SYSTOLIC BLOOD PRESSURE: 112 MMHG | DIASTOLIC BLOOD PRESSURE: 93 MMHG

## 2021-05-27 VITALS — DIASTOLIC BLOOD PRESSURE: 61 MMHG | SYSTOLIC BLOOD PRESSURE: 140 MMHG

## 2021-05-27 VITALS — SYSTOLIC BLOOD PRESSURE: 124 MMHG | DIASTOLIC BLOOD PRESSURE: 53 MMHG

## 2021-05-27 VITALS — DIASTOLIC BLOOD PRESSURE: 59 MMHG | SYSTOLIC BLOOD PRESSURE: 146 MMHG

## 2021-05-27 VITALS — SYSTOLIC BLOOD PRESSURE: 138 MMHG | DIASTOLIC BLOOD PRESSURE: 62 MMHG

## 2021-05-27 VITALS — SYSTOLIC BLOOD PRESSURE: 150 MMHG | DIASTOLIC BLOOD PRESSURE: 71 MMHG

## 2021-05-27 VITALS — SYSTOLIC BLOOD PRESSURE: 146 MMHG | DIASTOLIC BLOOD PRESSURE: 59 MMHG

## 2021-05-27 LAB
BASOPHILS # BLD AUTO: 0 10*3/UL (ref 0–0.1)
BASOPHILS NFR BLD AUTO: 0.1 % (ref 0–1)
DEPRECATED NEUTROPHILS # BLD AUTO: 13.2 10*3/UL (ref 2.1–6.9)
EOSINOPHIL # BLD AUTO: 0.1 10*3/UL (ref 0–0.4)
EOSINOPHIL NFR BLD AUTO: 0.6 % (ref 0–6)
ERYTHROCYTE [DISTWIDTH] IN CORD BLOOD: 17.2 % (ref 11.7–14.4)
HCT VFR BLD AUTO: 23.6 % (ref 34.2–44.1)
HGB BLD-MCNC: 7.6 G/DL (ref 12–16)
LYMPHOCYTES # BLD: 1.4 10*3/UL (ref 1–3.2)
LYMPHOCYTES NFR BLD AUTO: 8.5 % (ref 18–39.1)
MCH RBC QN AUTO: 29 PG (ref 28–32)
MCHC RBC AUTO-ENTMCNC: 32.2 G/DL (ref 31–35)
MCV RBC AUTO: 90.1 FL (ref 81–99)
MONOCYTES # BLD AUTO: 1.8 10*3/UL (ref 0.2–0.8)
MONOCYTES NFR BLD AUTO: 10.6 % (ref 4.4–11.3)
NEUTS SEG NFR BLD AUTO: 79.1 % (ref 38.7–80)
PLATELET # BLD AUTO: 384 X10E3/UL (ref 140–360)
RBC # BLD AUTO: 2.62 X10E6/UL (ref 3.6–5.1)

## 2021-05-27 RX ADMIN — Medication SCH ML: at 19:30

## 2021-05-27 RX ADMIN — METOCLOPRAMIDE SCH MG: 5 INJECTION, SOLUTION INTRAMUSCULAR; INTRAVENOUS at 12:00

## 2021-05-27 RX ADMIN — FLUTICASONE PROPIONATE AND SALMETEROL SCH EA: 50; 100 POWDER RESPIRATORY (INHALATION) at 19:00

## 2021-05-27 RX ADMIN — SODIUM CHLORIDE SCH MLS/HR: 9 INJECTION, SOLUTION INTRAVENOUS at 09:38

## 2021-05-27 RX ADMIN — ACETYLCYSTEINE SCH ML: 200 SOLUTION ORAL; RESPIRATORY (INHALATION) at 19:30

## 2021-05-27 RX ADMIN — SALINE NASAL SPRAY SCH SPRAY: 1.5 SOLUTION NASAL at 08:28

## 2021-05-27 RX ADMIN — Medication SCH ML: at 12:55

## 2021-05-27 RX ADMIN — AMIODARONE HYDROCHLORIDE SCH MG: 200 TABLET ORAL at 08:27

## 2021-05-27 RX ADMIN — SALINE NASAL SPRAY SCH SPRAY: 1.5 SOLUTION NASAL at 16:43

## 2021-05-27 RX ADMIN — METOCLOPRAMIDE SCH MG: 5 INJECTION, SOLUTION INTRAMUSCULAR; INTRAVENOUS at 00:10

## 2021-05-27 RX ADMIN — ACETYLCYSTEINE SCH ML: 200 SOLUTION ORAL; RESPIRATORY (INHALATION) at 13:04

## 2021-05-27 RX ADMIN — ACETYLCYSTEINE SCH ML: 200 SOLUTION ORAL; RESPIRATORY (INHALATION) at 06:45

## 2021-05-27 RX ADMIN — ACETYLCYSTEINE SCH ML: 200 SOLUTION ORAL; RESPIRATORY (INHALATION) at 01:00

## 2021-05-27 RX ADMIN — METOCLOPRAMIDE SCH MG: 5 INJECTION, SOLUTION INTRAMUSCULAR; INTRAVENOUS at 16:43

## 2021-05-27 RX ADMIN — FLUTICASONE PROPIONATE AND SALMETEROL SCH EA: 50; 100 POWDER RESPIRATORY (INHALATION) at 06:59

## 2021-05-27 RX ADMIN — SODIUM CHLORIDE SCH MLS/HR: 9 INJECTION, SOLUTION INTRAVENOUS at 20:25

## 2021-05-27 RX ADMIN — Medication SCH ML: at 01:07

## 2021-05-27 RX ADMIN — SODIUM CHLORIDE SCH MG: 900 INJECTION INTRAVENOUS at 16:43

## 2021-05-27 RX ADMIN — Medication SCH ML: at 06:45

## 2021-05-27 RX ADMIN — CYANOCOBALAMIN SCH MCG: 1000 INJECTION, SOLUTION INTRAMUSCULAR at 08:28

## 2021-05-27 RX ADMIN — METOCLOPRAMIDE SCH MG: 5 INJECTION, SOLUTION INTRAMUSCULAR; INTRAVENOUS at 06:10

## 2021-05-27 RX ADMIN — SALINE NASAL SPRAY SCH SPRAY: 1.5 SOLUTION NASAL at 06:10

## 2021-05-27 RX ADMIN — Medication SCH MG: at 08:28

## 2021-05-27 RX ADMIN — SALINE NASAL SPRAY SCH SPRAY: 1.5 SOLUTION NASAL at 14:06

## 2021-05-27 RX ADMIN — SODIUM CHLORIDE SCH MLS/HR: 9 INJECTION, SOLUTION INTRAVENOUS at 14:06

## 2021-05-27 RX ADMIN — SODIUM CHLORIDE SCH MG: 900 INJECTION INTRAVENOUS at 08:28

## 2021-05-27 RX ADMIN — SALINE NASAL SPRAY SCH SPRAY: 1.5 SOLUTION NASAL at 21:30

## 2021-05-27 RX ADMIN — SODIUM CHLORIDE SCH MLS/HR: 9 INJECTION, SOLUTION INTRAVENOUS at 04:22

## 2021-05-27 RX ADMIN — METOPROLOL SUCCINATE SCH MG: 50 TABLET, EXTENDED RELEASE ORAL at 08:28

## 2021-05-28 VITALS — DIASTOLIC BLOOD PRESSURE: 61 MMHG | SYSTOLIC BLOOD PRESSURE: 136 MMHG

## 2021-05-28 VITALS — SYSTOLIC BLOOD PRESSURE: 145 MMHG | DIASTOLIC BLOOD PRESSURE: 70 MMHG

## 2021-05-28 VITALS — DIASTOLIC BLOOD PRESSURE: 64 MMHG | SYSTOLIC BLOOD PRESSURE: 143 MMHG

## 2021-05-28 VITALS — SYSTOLIC BLOOD PRESSURE: 133 MMHG | DIASTOLIC BLOOD PRESSURE: 58 MMHG

## 2021-05-28 VITALS — DIASTOLIC BLOOD PRESSURE: 62 MMHG | SYSTOLIC BLOOD PRESSURE: 147 MMHG

## 2021-05-28 VITALS — SYSTOLIC BLOOD PRESSURE: 143 MMHG | DIASTOLIC BLOOD PRESSURE: 64 MMHG

## 2021-05-28 VITALS — DIASTOLIC BLOOD PRESSURE: 65 MMHG | SYSTOLIC BLOOD PRESSURE: 149 MMHG

## 2021-05-28 VITALS — DIASTOLIC BLOOD PRESSURE: 58 MMHG | SYSTOLIC BLOOD PRESSURE: 141 MMHG

## 2021-05-28 LAB
ANION GAP SERPL CALC-SCNC: 14.1 MMOL/L (ref 8–16)
BASOPHILS # BLD AUTO: 0 10*3/UL (ref 0–0.1)
BASOPHILS NFR BLD AUTO: 0.3 % (ref 0–1)
BUN SERPL-MCNC: 21 MG/DL (ref 7–26)
BUN/CREAT SERPL: 21 (ref 6–25)
CALCIUM SERPL-MCNC: 8.6 MG/DL (ref 8.4–10.2)
CHLORIDE SERPL-SCNC: 100 MMOL/L (ref 98–107)
CO2 SERPL-SCNC: 29 MMOL/L (ref 22–29)
DEPRECATED NEUTROPHILS # BLD AUTO: 9.1 10*3/UL (ref 2.1–6.9)
EGFRCR SERPLBLD CKD-EPI 2021: 53 ML/MIN (ref 60–?)
EOSINOPHIL # BLD AUTO: 0.1 10*3/UL (ref 0–0.4)
EOSINOPHIL NFR BLD AUTO: 0.8 % (ref 0–6)
ERYTHROCYTE [DISTWIDTH] IN CORD BLOOD: 17.6 % (ref 11.7–14.4)
GLUCOSE SERPLBLD-MCNC: 109 MG/DL (ref 74–118)
HCT VFR BLD AUTO: 27.2 % (ref 34.2–44.1)
HGB BLD-MCNC: 8.7 G/DL (ref 12–16)
LYMPHOCYTES # BLD: 1.1 10*3/UL (ref 1–3.2)
LYMPHOCYTES NFR BLD AUTO: 9.5 % (ref 18–39.1)
MCH RBC QN AUTO: 29 PG (ref 28–32)
MCHC RBC AUTO-ENTMCNC: 32 G/DL (ref 31–35)
MCV RBC AUTO: 90.7 FL (ref 81–99)
MONOCYTES # BLD AUTO: 1.3 10*3/UL (ref 0.2–0.8)
MONOCYTES NFR BLD AUTO: 11.3 % (ref 4.4–11.3)
NEUTS SEG NFR BLD AUTO: 77.2 % (ref 38.7–80)
PLATELET # BLD AUTO: 326 X10E3/UL (ref 140–360)
POTASSIUM SERPL-SCNC: 4.1 MMOL/L (ref 3.5–5.1)
RBC # BLD AUTO: 3 X10E6/UL (ref 3.6–5.1)
SODIUM SERPL-SCNC: 139 MMOL/L (ref 136–145)

## 2021-05-28 PROCEDURE — 0DB78ZX EXCISION OF STOMACH, PYLORUS, VIA NATURAL OR ARTIFICIAL OPENING ENDOSCOPIC, DIAGNOSTIC: ICD-10-PCS | Performed by: INTERNAL MEDICINE

## 2021-05-28 RX ADMIN — Medication SCH ML: at 14:00

## 2021-05-28 RX ADMIN — CYANOCOBALAMIN SCH MCG: 1000 INJECTION, SOLUTION INTRAMUSCULAR at 10:22

## 2021-05-28 RX ADMIN — SALINE NASAL SPRAY SCH SPRAY: 1.5 SOLUTION NASAL at 14:09

## 2021-05-28 RX ADMIN — ACETYLCYSTEINE SCH ML: 200 SOLUTION ORAL; RESPIRATORY (INHALATION) at 01:25

## 2021-05-28 RX ADMIN — FLUTICASONE PROPIONATE AND SALMETEROL SCH EA: 50; 100 POWDER RESPIRATORY (INHALATION) at 07:32

## 2021-05-28 RX ADMIN — SALINE NASAL SPRAY SCH SPRAY: 1.5 SOLUTION NASAL at 10:13

## 2021-05-28 RX ADMIN — Medication SCH ML: at 20:50

## 2021-05-28 RX ADMIN — SODIUM CHLORIDE SCH MLS/HR: 9 INJECTION, SOLUTION INTRAVENOUS at 14:09

## 2021-05-28 RX ADMIN — METOCLOPRAMIDE SCH MG: 5 INJECTION, SOLUTION INTRAMUSCULAR; INTRAVENOUS at 18:08

## 2021-05-28 RX ADMIN — ACETYLCYSTEINE SCH ML: 200 SOLUTION ORAL; RESPIRATORY (INHALATION) at 14:09

## 2021-05-28 RX ADMIN — ACETYLCYSTEINE SCH ML: 200 SOLUTION ORAL; RESPIRATORY (INHALATION) at 19:23

## 2021-05-28 RX ADMIN — SALINE NASAL SPRAY SCH SPRAY: 1.5 SOLUTION NASAL at 18:08

## 2021-05-28 RX ADMIN — METOCLOPRAMIDE SCH MG: 5 INJECTION, SOLUTION INTRAMUSCULAR; INTRAVENOUS at 00:00

## 2021-05-28 RX ADMIN — AMIODARONE HYDROCHLORIDE SCH MG: 200 TABLET ORAL at 10:22

## 2021-05-28 RX ADMIN — Medication SCH ML: at 07:00

## 2021-05-28 RX ADMIN — SODIUM CHLORIDE SCH MLS/HR: 9 INJECTION, SOLUTION INTRAVENOUS at 04:47

## 2021-05-28 RX ADMIN — SODIUM CHLORIDE SCH MLS/HR: 9 INJECTION, SOLUTION INTRAVENOUS at 21:11

## 2021-05-28 RX ADMIN — SODIUM CHLORIDE SCH MG: 900 INJECTION INTRAVENOUS at 10:22

## 2021-05-28 RX ADMIN — SALINE NASAL SPRAY SCH SPRAY: 1.5 SOLUTION NASAL at 22:00

## 2021-05-28 RX ADMIN — ACETYLCYSTEINE SCH ML: 200 SOLUTION ORAL; RESPIRATORY (INHALATION) at 20:50

## 2021-05-28 RX ADMIN — Medication SCH MG: at 10:22

## 2021-05-28 RX ADMIN — FLUTICASONE PROPIONATE AND SALMETEROL SCH EA: 50; 100 POWDER RESPIRATORY (INHALATION) at 19:24

## 2021-05-28 RX ADMIN — SODIUM CHLORIDE SCH MG: 900 INJECTION INTRAVENOUS at 18:08

## 2021-05-28 RX ADMIN — Medication SCH ML: at 01:25

## 2021-05-28 RX ADMIN — METOPROLOL SUCCINATE SCH MG: 50 TABLET, EXTENDED RELEASE ORAL at 10:22

## 2021-05-28 RX ADMIN — ACETYLCYSTEINE SCH ML: 200 SOLUTION ORAL; RESPIRATORY (INHALATION) at 07:32

## 2021-05-28 RX ADMIN — SODIUM CHLORIDE SCH MLS/HR: 9 INJECTION, SOLUTION INTRAVENOUS at 10:22

## 2021-05-28 RX ADMIN — SALINE NASAL SPRAY SCH SPRAY: 1.5 SOLUTION NASAL at 04:47

## 2021-05-28 RX ADMIN — METOCLOPRAMIDE SCH MG: 5 INJECTION, SOLUTION INTRAMUSCULAR; INTRAVENOUS at 11:46

## 2021-05-28 RX ADMIN — METOCLOPRAMIDE SCH MG: 5 INJECTION, SOLUTION INTRAMUSCULAR; INTRAVENOUS at 04:47

## 2021-05-29 VITALS — SYSTOLIC BLOOD PRESSURE: 137 MMHG | DIASTOLIC BLOOD PRESSURE: 55 MMHG

## 2021-05-29 VITALS — SYSTOLIC BLOOD PRESSURE: 148 MMHG | DIASTOLIC BLOOD PRESSURE: 59 MMHG

## 2021-05-29 VITALS — SYSTOLIC BLOOD PRESSURE: 142 MMHG | DIASTOLIC BLOOD PRESSURE: 66 MMHG

## 2021-05-29 VITALS — SYSTOLIC BLOOD PRESSURE: 127 MMHG | DIASTOLIC BLOOD PRESSURE: 55 MMHG

## 2021-05-29 VITALS — DIASTOLIC BLOOD PRESSURE: 53 MMHG | SYSTOLIC BLOOD PRESSURE: 126 MMHG

## 2021-05-29 VITALS — SYSTOLIC BLOOD PRESSURE: 139 MMHG | DIASTOLIC BLOOD PRESSURE: 62 MMHG

## 2021-05-29 LAB
ANION GAP SERPL CALC-SCNC: 14.9 MMOL/L (ref 8–16)
BASOPHILS # BLD AUTO: 0 10*3/UL (ref 0–0.1)
BASOPHILS NFR BLD AUTO: 0.4 % (ref 0–1)
BUN SERPL-MCNC: 13 MG/DL (ref 7–26)
BUN/CREAT SERPL: 15 (ref 6–25)
CALCIUM SERPL-MCNC: 8.3 MG/DL (ref 8.4–10.2)
CHLORIDE SERPL-SCNC: 100 MMOL/L (ref 98–107)
CO2 SERPL-SCNC: 27 MMOL/L (ref 22–29)
DEPRECATED NEUTROPHILS # BLD AUTO: 5.6 10*3/UL (ref 2.1–6.9)
EGFRCR SERPLBLD CKD-EPI 2021: > 60 ML/MIN (ref 60–?)
EOSINOPHIL # BLD AUTO: 0.1 10*3/UL (ref 0–0.4)
EOSINOPHIL NFR BLD AUTO: 1.4 % (ref 0–6)
ERYTHROCYTE [DISTWIDTH] IN CORD BLOOD: 17.4 % (ref 11.7–14.4)
GLUCOSE SERPLBLD-MCNC: 102 MG/DL (ref 74–118)
HCT VFR BLD AUTO: 26.4 % (ref 34.2–44.1)
HGB BLD-MCNC: 8.6 G/DL (ref 12–16)
LYMPHOCYTES # BLD: 0.8 10*3/UL (ref 1–3.2)
LYMPHOCYTES NFR BLD AUTO: 10.5 % (ref 18–39.1)
MCH RBC QN AUTO: 29.6 PG (ref 28–32)
MCHC RBC AUTO-ENTMCNC: 32.6 G/DL (ref 31–35)
MCV RBC AUTO: 90.7 FL (ref 81–99)
MONOCYTES # BLD AUTO: 1 10*3/UL (ref 0.2–0.8)
MONOCYTES NFR BLD AUTO: 13.4 % (ref 4.4–11.3)
NEUTS SEG NFR BLD AUTO: 73.1 % (ref 38.7–80)
PLATELET # BLD AUTO: 296 X10E3/UL (ref 140–360)
POTASSIUM SERPL-SCNC: 3.9 MMOL/L (ref 3.5–5.1)
RBC # BLD AUTO: 2.91 X10E6/UL (ref 3.6–5.1)
SODIUM SERPL-SCNC: 138 MMOL/L (ref 136–145)

## 2021-05-29 RX ADMIN — FLUTICASONE PROPIONATE AND SALMETEROL SCH EA: 50; 100 POWDER RESPIRATORY (INHALATION) at 18:25

## 2021-05-29 RX ADMIN — SALINE NASAL SPRAY SCH SPRAY: 1.5 SOLUTION NASAL at 14:40

## 2021-05-29 RX ADMIN — SALINE NASAL SPRAY SCH SPRAY: 1.5 SOLUTION NASAL at 22:00

## 2021-05-29 RX ADMIN — Medication SCH ML: at 18:55

## 2021-05-29 RX ADMIN — METOCLOPRAMIDE SCH MG: 5 INJECTION, SOLUTION INTRAMUSCULAR; INTRAVENOUS at 00:00

## 2021-05-29 RX ADMIN — Medication SCH ML: at 01:40

## 2021-05-29 RX ADMIN — ACETYLCYSTEINE SCH ML: 200 SOLUTION ORAL; RESPIRATORY (INHALATION) at 07:43

## 2021-05-29 RX ADMIN — METOPROLOL SUCCINATE SCH MG: 50 TABLET, EXTENDED RELEASE ORAL at 08:08

## 2021-05-29 RX ADMIN — METOCLOPRAMIDE SCH MG: 5 INJECTION, SOLUTION INTRAMUSCULAR; INTRAVENOUS at 12:32

## 2021-05-29 RX ADMIN — CYANOCOBALAMIN SCH MCG: 1000 INJECTION, SOLUTION INTRAMUSCULAR at 08:07

## 2021-05-29 RX ADMIN — SODIUM CHLORIDE SCH MLS/HR: 9 INJECTION, SOLUTION INTRAVENOUS at 09:02

## 2021-05-29 RX ADMIN — FLUTICASONE PROPIONATE AND SALMETEROL SCH EA: 50; 100 POWDER RESPIRATORY (INHALATION) at 08:27

## 2021-05-29 RX ADMIN — SODIUM CHLORIDE SCH MG: 900 INJECTION INTRAVENOUS at 17:25

## 2021-05-29 RX ADMIN — SODIUM CHLORIDE SCH MLS/HR: 9 INJECTION, SOLUTION INTRAVENOUS at 05:30

## 2021-05-29 RX ADMIN — METOCLOPRAMIDE SCH MG: 5 INJECTION, SOLUTION INTRAMUSCULAR; INTRAVENOUS at 17:25

## 2021-05-29 RX ADMIN — Medication SCH ML: at 07:43

## 2021-05-29 RX ADMIN — ACETYLCYSTEINE SCH ML: 200 SOLUTION ORAL; RESPIRATORY (INHALATION) at 13:45

## 2021-05-29 RX ADMIN — SALINE NASAL SPRAY SCH SPRAY: 1.5 SOLUTION NASAL at 06:00

## 2021-05-29 RX ADMIN — METOCLOPRAMIDE SCH MG: 5 INJECTION, SOLUTION INTRAMUSCULAR; INTRAVENOUS at 06:00

## 2021-05-29 RX ADMIN — SALINE NASAL SPRAY SCH SPRAY: 1.5 SOLUTION NASAL at 10:25

## 2021-05-29 RX ADMIN — SODIUM CHLORIDE SCH MLS/HR: 9 INJECTION, SOLUTION INTRAVENOUS at 21:52

## 2021-05-29 RX ADMIN — SODIUM CHLORIDE SCH MG: 900 INJECTION INTRAVENOUS at 08:07

## 2021-05-29 RX ADMIN — Medication SCH ML: at 13:45

## 2021-05-29 RX ADMIN — ACETYLCYSTEINE SCH ML: 200 SOLUTION ORAL; RESPIRATORY (INHALATION) at 19:05

## 2021-05-29 RX ADMIN — AMIODARONE HYDROCHLORIDE SCH MG: 200 TABLET ORAL at 08:07

## 2021-05-29 RX ADMIN — SODIUM CHLORIDE SCH MLS/HR: 9 INJECTION, SOLUTION INTRAVENOUS at 12:33

## 2021-05-29 RX ADMIN — Medication SCH MG: at 08:07

## 2021-05-29 RX ADMIN — SALINE NASAL SPRAY SCH SPRAY: 1.5 SOLUTION NASAL at 17:27

## 2021-05-30 VITALS — DIASTOLIC BLOOD PRESSURE: 60 MMHG | SYSTOLIC BLOOD PRESSURE: 132 MMHG

## 2021-05-30 VITALS — SYSTOLIC BLOOD PRESSURE: 126 MMHG | DIASTOLIC BLOOD PRESSURE: 65 MMHG

## 2021-05-30 VITALS — SYSTOLIC BLOOD PRESSURE: 139 MMHG | DIASTOLIC BLOOD PRESSURE: 63 MMHG

## 2021-05-30 VITALS — SYSTOLIC BLOOD PRESSURE: 146 MMHG | DIASTOLIC BLOOD PRESSURE: 67 MMHG

## 2021-05-30 VITALS — DIASTOLIC BLOOD PRESSURE: 61 MMHG | SYSTOLIC BLOOD PRESSURE: 133 MMHG

## 2021-05-30 VITALS — SYSTOLIC BLOOD PRESSURE: 132 MMHG | DIASTOLIC BLOOD PRESSURE: 60 MMHG

## 2021-05-30 VITALS — SYSTOLIC BLOOD PRESSURE: 130 MMHG | DIASTOLIC BLOOD PRESSURE: 68 MMHG

## 2021-05-30 RX ADMIN — Medication SCH ML: at 00:44

## 2021-05-30 RX ADMIN — ACETYLCYSTEINE SCH ML: 200 SOLUTION ORAL; RESPIRATORY (INHALATION) at 00:43

## 2021-05-30 RX ADMIN — FLUTICASONE PROPIONATE AND SALMETEROL SCH EA: 50; 100 POWDER RESPIRATORY (INHALATION) at 07:00

## 2021-05-30 RX ADMIN — ACETYLCYSTEINE SCH ML: 200 SOLUTION ORAL; RESPIRATORY (INHALATION) at 19:20

## 2021-05-30 RX ADMIN — METOCLOPRAMIDE SCH MG: 5 INJECTION, SOLUTION INTRAMUSCULAR; INTRAVENOUS at 00:45

## 2021-05-30 RX ADMIN — Medication SCH ML: at 07:40

## 2021-05-30 RX ADMIN — METOCLOPRAMIDE SCH MG: 5 INJECTION, SOLUTION INTRAMUSCULAR; INTRAVENOUS at 06:11

## 2021-05-30 RX ADMIN — SODIUM CHLORIDE SCH MG: 900 INJECTION INTRAVENOUS at 08:02

## 2021-05-30 RX ADMIN — SODIUM CHLORIDE SCH MLS/HR: 9 INJECTION, SOLUTION INTRAVENOUS at 21:38

## 2021-05-30 RX ADMIN — SALINE NASAL SPRAY SCH SPRAY: 1.5 SOLUTION NASAL at 21:49

## 2021-05-30 RX ADMIN — Medication SCH ML: at 13:56

## 2021-05-30 RX ADMIN — METOCLOPRAMIDE SCH MG: 5 INJECTION, SOLUTION INTRAMUSCULAR; INTRAVENOUS at 18:16

## 2021-05-30 RX ADMIN — ACETYLCYSTEINE SCH ML: 200 SOLUTION ORAL; RESPIRATORY (INHALATION) at 07:40

## 2021-05-30 RX ADMIN — SODIUM CHLORIDE SCH MG: 900 INJECTION INTRAVENOUS at 18:16

## 2021-05-30 RX ADMIN — METOCLOPRAMIDE SCH MG: 5 INJECTION, SOLUTION INTRAMUSCULAR; INTRAVENOUS at 12:34

## 2021-05-30 RX ADMIN — ACETYLCYSTEINE SCH ML: 200 SOLUTION ORAL; RESPIRATORY (INHALATION) at 13:56

## 2021-05-30 RX ADMIN — SALINE NASAL SPRAY SCH SPRAY: 1.5 SOLUTION NASAL at 10:03

## 2021-05-30 RX ADMIN — SALINE NASAL SPRAY SCH SPRAY: 1.5 SOLUTION NASAL at 14:04

## 2021-05-30 RX ADMIN — SODIUM CHLORIDE SCH MLS/HR: 9 INJECTION, SOLUTION INTRAVENOUS at 12:34

## 2021-05-30 RX ADMIN — Medication SCH MG: at 08:02

## 2021-05-30 RX ADMIN — AMIODARONE HYDROCHLORIDE SCH MG: 200 TABLET ORAL at 08:02

## 2021-05-30 RX ADMIN — METOPROLOL SUCCINATE SCH MG: 50 TABLET, EXTENDED RELEASE ORAL at 08:02

## 2021-05-30 RX ADMIN — Medication SCH ML: at 19:05

## 2021-05-30 RX ADMIN — SALINE NASAL SPRAY SCH SPRAY: 1.5 SOLUTION NASAL at 18:17

## 2021-05-30 RX ADMIN — SODIUM CHLORIDE SCH MLS/HR: 9 INJECTION, SOLUTION INTRAVENOUS at 06:11

## 2021-05-30 RX ADMIN — SALINE NASAL SPRAY SCH SPRAY: 1.5 SOLUTION NASAL at 06:11

## 2021-05-30 RX ADMIN — FLUTICASONE PROPIONATE AND SALMETEROL SCH EA: 50; 100 POWDER RESPIRATORY (INHALATION) at 19:00

## 2021-05-30 RX ADMIN — CYANOCOBALAMIN SCH MCG: 1000 INJECTION, SOLUTION INTRAMUSCULAR at 08:02

## 2021-05-31 VITALS — DIASTOLIC BLOOD PRESSURE: 58 MMHG | SYSTOLIC BLOOD PRESSURE: 135 MMHG

## 2021-05-31 VITALS — SYSTOLIC BLOOD PRESSURE: 140 MMHG | DIASTOLIC BLOOD PRESSURE: 69 MMHG

## 2021-05-31 VITALS — SYSTOLIC BLOOD PRESSURE: 140 MMHG | DIASTOLIC BLOOD PRESSURE: 68 MMHG

## 2021-05-31 VITALS — DIASTOLIC BLOOD PRESSURE: 56 MMHG | SYSTOLIC BLOOD PRESSURE: 123 MMHG

## 2021-05-31 VITALS — SYSTOLIC BLOOD PRESSURE: 138 MMHG | DIASTOLIC BLOOD PRESSURE: 59 MMHG

## 2021-05-31 VITALS — DIASTOLIC BLOOD PRESSURE: 69 MMHG | SYSTOLIC BLOOD PRESSURE: 140 MMHG

## 2021-05-31 VITALS — DIASTOLIC BLOOD PRESSURE: 48 MMHG | SYSTOLIC BLOOD PRESSURE: 114 MMHG

## 2021-05-31 LAB
ANION GAP SERPL CALC-SCNC: 12.8 MMOL/L (ref 8–16)
BASOPHILS # BLD AUTO: 0 10*3/UL (ref 0–0.1)
BASOPHILS NFR BLD AUTO: 0.4 % (ref 0–1)
BUN SERPL-MCNC: 13 MG/DL (ref 7–26)
BUN/CREAT SERPL: 16 (ref 6–25)
CALCIUM SERPL-MCNC: 8.1 MG/DL (ref 8.4–10.2)
CHLORIDE SERPL-SCNC: 100 MMOL/L (ref 98–107)
CO2 SERPL-SCNC: 26 MMOL/L (ref 22–29)
DEPRECATED NEUTROPHILS # BLD AUTO: 4.7 10*3/UL (ref 2.1–6.9)
EGFRCR SERPLBLD CKD-EPI 2021: > 60 ML/MIN (ref 60–?)
EOSINOPHIL # BLD AUTO: 0.1 10*3/UL (ref 0–0.4)
EOSINOPHIL NFR BLD AUTO: 1.8 % (ref 0–6)
ERYTHROCYTE [DISTWIDTH] IN CORD BLOOD: 18 % (ref 11.7–14.4)
GLUCOSE SERPLBLD-MCNC: 113 MG/DL (ref 74–118)
HCT VFR BLD AUTO: 27.2 % (ref 34.2–44.1)
HGB BLD-MCNC: 8.5 G/DL (ref 12–16)
LYMPHOCYTES # BLD: 0.7 10*3/UL (ref 1–3.2)
LYMPHOCYTES NFR BLD AUTO: 10.5 % (ref 18–39.1)
MCH RBC QN AUTO: 29 PG (ref 28–32)
MCHC RBC AUTO-ENTMCNC: 31.3 G/DL (ref 31–35)
MCV RBC AUTO: 92.8 FL (ref 81–99)
MONOCYTES # BLD AUTO: 1.1 10*3/UL (ref 0.2–0.8)
MONOCYTES NFR BLD AUTO: 16.5 % (ref 4.4–11.3)
NEUTS SEG NFR BLD AUTO: 69.9 % (ref 38.7–80)
PLATELET # BLD AUTO: 232 X10E3/UL (ref 140–360)
POTASSIUM SERPL-SCNC: 3.8 MMOL/L (ref 3.5–5.1)
RBC # BLD AUTO: 2.93 X10E6/UL (ref 3.6–5.1)
SODIUM SERPL-SCNC: 135 MMOL/L (ref 136–145)

## 2021-05-31 RX ADMIN — ACETYLCYSTEINE SCH ML: 200 SOLUTION ORAL; RESPIRATORY (INHALATION) at 00:05

## 2021-05-31 RX ADMIN — SODIUM CHLORIDE SCH MG: 900 INJECTION INTRAVENOUS at 09:00

## 2021-05-31 RX ADMIN — Medication SCH ML: at 13:31

## 2021-05-31 RX ADMIN — SODIUM CHLORIDE SCH MLS/HR: 9 INJECTION, SOLUTION INTRAVENOUS at 13:00

## 2021-05-31 RX ADMIN — SODIUM CHLORIDE SCH MG: 900 INJECTION INTRAVENOUS at 16:56

## 2021-05-31 RX ADMIN — SODIUM CHLORIDE SCH MLS/HR: 9 INJECTION, SOLUTION INTRAVENOUS at 05:07

## 2021-05-31 RX ADMIN — METOCLOPRAMIDE SCH MG: 5 INJECTION, SOLUTION INTRAMUSCULAR; INTRAVENOUS at 17:29

## 2021-05-31 RX ADMIN — SALINE NASAL SPRAY SCH SPRAY: 1.5 SOLUTION NASAL at 06:00

## 2021-05-31 RX ADMIN — SALINE NASAL SPRAY SCH SPRAY: 1.5 SOLUTION NASAL at 14:00

## 2021-05-31 RX ADMIN — METOCLOPRAMIDE SCH MG: 5 INJECTION, SOLUTION INTRAMUSCULAR; INTRAVENOUS at 06:00

## 2021-05-31 RX ADMIN — METOCLOPRAMIDE SCH MG: 5 INJECTION, SOLUTION INTRAMUSCULAR; INTRAVENOUS at 23:41

## 2021-05-31 RX ADMIN — Medication SCH ML: at 00:05

## 2021-05-31 RX ADMIN — Medication SCH MG: at 09:00

## 2021-05-31 RX ADMIN — Medication PRN MG: at 23:41

## 2021-05-31 RX ADMIN — SALINE NASAL SPRAY SCH SPRAY: 1.5 SOLUTION NASAL at 21:15

## 2021-05-31 RX ADMIN — FLUTICASONE PROPIONATE AND SALMETEROL SCH EA: 50; 100 POWDER RESPIRATORY (INHALATION) at 07:09

## 2021-05-31 RX ADMIN — AMIODARONE HYDROCHLORIDE SCH MG: 200 TABLET ORAL at 09:00

## 2021-05-31 RX ADMIN — ACETYLCYSTEINE SCH ML: 200 SOLUTION ORAL; RESPIRATORY (INHALATION) at 19:00

## 2021-05-31 RX ADMIN — CYANOCOBALAMIN SCH MCG: 1000 INJECTION, SOLUTION INTRAMUSCULAR at 09:00

## 2021-05-31 RX ADMIN — Medication SCH ML: at 19:05

## 2021-05-31 RX ADMIN — SALINE NASAL SPRAY SCH SPRAY: 1.5 SOLUTION NASAL at 17:30

## 2021-05-31 RX ADMIN — METOPROLOL SUCCINATE SCH MG: 50 TABLET, EXTENDED RELEASE ORAL at 09:00

## 2021-05-31 RX ADMIN — FLUTICASONE PROPIONATE AND SALMETEROL SCH EA: 50; 100 POWDER RESPIRATORY (INHALATION) at 20:05

## 2021-05-31 RX ADMIN — POLYETHYLENE GLYCOL 3350 PRN GM: 17 POWDER, FOR SOLUTION ORAL at 16:56

## 2021-05-31 RX ADMIN — SALINE NASAL SPRAY SCH SPRAY: 1.5 SOLUTION NASAL at 10:00

## 2021-05-31 RX ADMIN — Medication PRN MG: at 00:22

## 2021-05-31 RX ADMIN — METOCLOPRAMIDE SCH MG: 5 INJECTION, SOLUTION INTRAMUSCULAR; INTRAVENOUS at 00:18

## 2021-05-31 RX ADMIN — Medication SCH ML: at 07:09

## 2021-05-31 RX ADMIN — METOCLOPRAMIDE SCH MG: 5 INJECTION, SOLUTION INTRAMUSCULAR; INTRAVENOUS at 12:00

## 2021-05-31 RX ADMIN — ACETYLCYSTEINE SCH ML: 200 SOLUTION ORAL; RESPIRATORY (INHALATION) at 13:00

## 2021-05-31 RX ADMIN — ACETYLCYSTEINE SCH ML: 200 SOLUTION ORAL; RESPIRATORY (INHALATION) at 07:00

## 2021-06-01 VITALS — DIASTOLIC BLOOD PRESSURE: 62 MMHG | SYSTOLIC BLOOD PRESSURE: 121 MMHG

## 2021-06-01 VITALS — SYSTOLIC BLOOD PRESSURE: 121 MMHG | DIASTOLIC BLOOD PRESSURE: 62 MMHG

## 2021-06-01 VITALS — DIASTOLIC BLOOD PRESSURE: 60 MMHG | SYSTOLIC BLOOD PRESSURE: 130 MMHG

## 2021-06-01 VITALS — SYSTOLIC BLOOD PRESSURE: 129 MMHG | DIASTOLIC BLOOD PRESSURE: 57 MMHG

## 2021-06-01 VITALS — DIASTOLIC BLOOD PRESSURE: 46 MMHG | SYSTOLIC BLOOD PRESSURE: 110 MMHG

## 2021-06-01 VITALS — DIASTOLIC BLOOD PRESSURE: 56 MMHG | SYSTOLIC BLOOD PRESSURE: 132 MMHG

## 2021-06-01 RX ADMIN — Medication SCH MG: at 09:50

## 2021-06-01 RX ADMIN — ACETYLCYSTEINE SCH ML: 200 SOLUTION ORAL; RESPIRATORY (INHALATION) at 01:00

## 2021-06-01 RX ADMIN — FLUTICASONE PROPIONATE AND SALMETEROL SCH EA: 50; 100 POWDER RESPIRATORY (INHALATION) at 06:58

## 2021-06-01 RX ADMIN — Medication SCH ML: at 06:50

## 2021-06-01 RX ADMIN — ACETYLCYSTEINE SCH ML: 200 SOLUTION ORAL; RESPIRATORY (INHALATION) at 06:58

## 2021-06-01 RX ADMIN — CYANOCOBALAMIN SCH MCG: 1000 INJECTION, SOLUTION INTRAMUSCULAR at 09:50

## 2021-06-01 RX ADMIN — METOCLOPRAMIDE SCH MG: 5 INJECTION, SOLUTION INTRAMUSCULAR; INTRAVENOUS at 05:14

## 2021-06-01 RX ADMIN — Medication SCH ML: at 01:20

## 2021-06-01 RX ADMIN — METOCLOPRAMIDE SCH MG: 5 INJECTION, SOLUTION INTRAMUSCULAR; INTRAVENOUS at 13:42

## 2021-06-01 RX ADMIN — SALINE NASAL SPRAY SCH SPRAY: 1.5 SOLUTION NASAL at 09:51

## 2021-06-01 RX ADMIN — SALINE NASAL SPRAY SCH SPRAY: 1.5 SOLUTION NASAL at 13:42

## 2021-06-01 RX ADMIN — METOPROLOL SUCCINATE SCH MG: 50 TABLET, EXTENDED RELEASE ORAL at 09:51

## 2021-06-01 RX ADMIN — ACETYLCYSTEINE SCH ML: 200 SOLUTION ORAL; RESPIRATORY (INHALATION) at 13:00

## 2021-06-01 RX ADMIN — AMIODARONE HYDROCHLORIDE SCH MG: 200 TABLET ORAL at 09:50

## 2021-06-01 RX ADMIN — SODIUM CHLORIDE SCH MG: 900 INJECTION INTRAVENOUS at 09:50

## 2021-06-01 RX ADMIN — Medication SCH ML: at 13:06

## 2021-06-01 RX ADMIN — SALINE NASAL SPRAY SCH SPRAY: 1.5 SOLUTION NASAL at 05:14

## 2021-07-12 ENCOUNTER — HOSPITAL ENCOUNTER (OUTPATIENT)
Dept: HOSPITAL 88 - ST | Age: 86
LOS: 19 days | End: 2021-07-31
Attending: INTERNAL MEDICINE
Payer: MEDICARE

## 2021-07-12 DIAGNOSIS — Z79.01: ICD-10-CM

## 2021-07-12 DIAGNOSIS — D64.89: ICD-10-CM

## 2021-07-12 DIAGNOSIS — R13.13: Primary | ICD-10-CM

## 2021-07-12 DIAGNOSIS — I48.91: ICD-10-CM

## 2021-07-12 DIAGNOSIS — Z85.3: ICD-10-CM

## 2022-03-27 ENCOUNTER — HOSPITAL ENCOUNTER (EMERGENCY)
Dept: HOSPITAL 88 - ER | Age: 87
Discharge: HOME | End: 2022-03-27
Payer: MEDICARE

## 2022-03-27 VITALS — DIASTOLIC BLOOD PRESSURE: 84 MMHG | SYSTOLIC BLOOD PRESSURE: 149 MMHG

## 2022-03-27 VITALS — WEIGHT: 124 LBS | HEIGHT: 72 IN | BODY MASS INDEX: 16.8 KG/M2

## 2022-03-27 DIAGNOSIS — Z85.3: ICD-10-CM

## 2022-03-27 DIAGNOSIS — E03.9: ICD-10-CM

## 2022-03-27 DIAGNOSIS — I12.9: ICD-10-CM

## 2022-03-27 DIAGNOSIS — J44.9: ICD-10-CM

## 2022-03-27 DIAGNOSIS — R60.9: Primary | ICD-10-CM
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
## 2022-03-27 DIAGNOSIS — S80.11XD: ICD-10-CM

## 2022-03-27 DIAGNOSIS — E78.5: ICD-10-CM

## 2022-03-27 DIAGNOSIS — I48.91: ICD-10-CM

## 2022-03-27 DIAGNOSIS — N18.9: ICD-10-CM

## 2022-03-27 DIAGNOSIS — Z85.53: ICD-10-CM

## 2022-03-27 LAB
ALBUMIN SERPL-MCNC: 3.5 G/DL (ref 3.5–5)
ALBUMIN/GLOB SERPL: 1.2 {RATIO} (ref 0.8–2)
ALP SERPL-CCNC: 54 IU/L (ref 40–150)
ALT SERPL-CCNC: 19 IU/L (ref 0–55)
ANION GAP SERPL CALC-SCNC: 11.9 MMOL/L (ref 8–16)
BASOPHILS # BLD AUTO: 0.1 10*3/UL (ref 0–0.1)
BASOPHILS NFR BLD AUTO: 0.6 % (ref 0–1)
BUN SERPL-MCNC: 19 MG/DL (ref 7–26)
BUN/CREAT SERPL: 16 (ref 6–25)
CALCIUM SERPL-MCNC: 9 MG/DL (ref 8.4–10.2)
CHLORIDE SERPL-SCNC: 103 MMOL/L (ref 98–107)
CO2 SERPL-SCNC: 27 MMOL/L (ref 22–29)
DEPRECATED APTT PLAS QN: 23.7 SECONDS (ref 23.8–35.5)
DEPRECATED INR PLAS: 0.96
DEPRECATED NEUTROPHILS # BLD AUTO: 7 10*3/UL (ref 2.1–6.9)
EGFRCR SERPLBLD CKD-EPI 2021: 42 ML/MIN (ref 60–?)
EOSINOPHIL # BLD AUTO: 0.1 10*3/UL (ref 0–0.4)
EOSINOPHIL NFR BLD AUTO: 0.9 % (ref 0–6)
ERYTHROCYTE [DISTWIDTH] IN CORD BLOOD: 15.8 % (ref 11.7–14.4)
GLOBULIN PLAS-MCNC: 3 G/DL (ref 2.3–3.5)
GLUCOSE SERPLBLD-MCNC: 109 MG/DL (ref 74–118)
HCT VFR BLD AUTO: 31.4 % (ref 34.2–44.1)
HGB BLD-MCNC: 10.2 G/DL (ref 12–16)
LYMPHOCYTES # BLD: 1.1 10*3/UL (ref 1–3.2)
LYMPHOCYTES NFR BLD AUTO: 12 % (ref 18–39.1)
MCH RBC QN AUTO: 32.6 PG (ref 28–32)
MCHC RBC AUTO-ENTMCNC: 32.5 G/DL (ref 31–35)
MCV RBC AUTO: 100.3 FL (ref 81–99)
MONOCYTES # BLD AUTO: 1.1 10*3/UL (ref 0.2–0.8)
MONOCYTES NFR BLD AUTO: 11.3 % (ref 4.4–11.3)
NEUTS SEG NFR BLD AUTO: 74.9 % (ref 38.7–80)
PLATELET # BLD AUTO: 244 X10E3/UL (ref 140–360)
POTASSIUM SERPL-SCNC: 3.9 MMOL/L (ref 3.5–5.1)
PROTHROMBIN TIME: 13.7 SECONDS (ref 11.9–14.5)
RBC # BLD AUTO: 3.13 X10E6/UL (ref 3.6–5.1)
SODIUM SERPL-SCNC: 138 MMOL/L (ref 136–145)

## 2022-03-27 PROCEDURE — 85610 PROTHROMBIN TIME: CPT

## 2022-03-27 PROCEDURE — 85730 THROMBOPLASTIN TIME PARTIAL: CPT

## 2022-03-27 PROCEDURE — 85025 COMPLETE CBC W/AUTO DIFF WBC: CPT

## 2022-03-27 PROCEDURE — 36415 COLL VENOUS BLD VENIPUNCTURE: CPT

## 2022-03-27 PROCEDURE — 87040 BLOOD CULTURE FOR BACTERIA: CPT

## 2022-03-27 PROCEDURE — 80053 COMPREHEN METABOLIC PANEL: CPT

## 2022-03-27 PROCEDURE — 99283 EMERGENCY DEPT VISIT LOW MDM: CPT

## 2022-03-27 PROCEDURE — 93971 EXTREMITY STUDY: CPT

## 2023-03-08 ENCOUNTER — HOSPITAL ENCOUNTER (INPATIENT)
Dept: HOSPITAL 88 - ER | Age: 88
LOS: 4 days | Discharge: HOME | DRG: 202 | End: 2023-03-12
Attending: INTERNAL MEDICINE | Admitting: INTERNAL MEDICINE
Payer: MEDICARE

## 2023-03-08 VITALS — SYSTOLIC BLOOD PRESSURE: 123 MMHG | DIASTOLIC BLOOD PRESSURE: 67 MMHG

## 2023-03-08 VITALS — BODY MASS INDEX: 20.66 KG/M2 | HEIGHT: 65 IN | WEIGHT: 124 LBS

## 2023-03-08 DIAGNOSIS — E78.5: ICD-10-CM

## 2023-03-08 DIAGNOSIS — I50.30: ICD-10-CM

## 2023-03-08 DIAGNOSIS — I44.7: ICD-10-CM

## 2023-03-08 DIAGNOSIS — E03.9: ICD-10-CM

## 2023-03-08 DIAGNOSIS — C90.00: ICD-10-CM

## 2023-03-08 DIAGNOSIS — G47.30: ICD-10-CM

## 2023-03-08 DIAGNOSIS — J40: Primary | ICD-10-CM

## 2023-03-08 DIAGNOSIS — D72.829: ICD-10-CM

## 2023-03-08 DIAGNOSIS — Z79.82: ICD-10-CM

## 2023-03-08 DIAGNOSIS — I48.91: ICD-10-CM

## 2023-03-08 DIAGNOSIS — N18.30: ICD-10-CM

## 2023-03-08 DIAGNOSIS — Z90.49: ICD-10-CM

## 2023-03-08 DIAGNOSIS — J44.1: ICD-10-CM

## 2023-03-08 DIAGNOSIS — R00.0: ICD-10-CM

## 2023-03-08 DIAGNOSIS — I25.10: ICD-10-CM

## 2023-03-08 DIAGNOSIS — Z95.0: ICD-10-CM

## 2023-03-08 DIAGNOSIS — I13.0: ICD-10-CM

## 2023-03-08 DIAGNOSIS — C64.9: ICD-10-CM

## 2023-03-08 DIAGNOSIS — Z95.5: ICD-10-CM

## 2023-03-08 DIAGNOSIS — Z20.822: ICD-10-CM

## 2023-03-08 LAB
ALBUMIN SERPL-MCNC: 3.3 G/DL (ref 3.5–5)
ALBUMIN/GLOB SERPL: 0.9 {RATIO} (ref 0.8–2)
ALP SERPL-CCNC: 78 IU/L (ref 40–150)
ALT SERPL-CCNC: 24 IU/L (ref 0–55)
ANION GAP SERPL CALC-SCNC: 17.9 MMOL/L (ref 8–16)
BACTERIA URNS QL MICRO: (no result) /HPF
BASOPHILS # BLD AUTO: 0 10*3/UL (ref 0–0.1)
BASOPHILS NFR BLD AUTO: 0.2 % (ref 0–1)
BUN SERPL-MCNC: 16 MG/DL (ref 7–26)
BUN/CREAT SERPL: 14 (ref 6–25)
CALCIUM SERPL-MCNC: 9 MG/DL (ref 8.4–10.2)
CHLORIDE SERPL-SCNC: 101 MMOL/L (ref 98–107)
CK MB SERPL-MCNC: 1.2 NG/ML (ref 0–5)
CK MB SERPL-MCNC: 2.2 NG/ML (ref 0–5)
CK SERPL-CCNC: 114 IU/L (ref 29–168)
CK SERPL-CCNC: 94 IU/L (ref 29–168)
CLARITY UR: (no result)
CO2 SERPL-SCNC: 22 MMOL/L (ref 22–29)
COLOR UR: YELLOW
DEPRECATED NEUTROPHILS # BLD AUTO: 9.4 10*3/UL (ref 2.1–6.9)
DEPRECATED RBC URNS MANUAL-ACNC: (no result) /HPF (ref 0–5)
EOSINOPHIL # BLD AUTO: 0 10*3/UL (ref 0–0.4)
EOSINOPHIL NFR BLD AUTO: 0 % (ref 0–6)
EPI CELLS URNS QL MICRO: (no result) /LPF
ERYTHROCYTE [DISTWIDTH] IN CORD BLOOD: 14.2 % (ref 11.7–14.4)
GLOBULIN PLAS-MCNC: 3.8 G/DL (ref 2.3–3.5)
GLUCOSE SERPLBLD-MCNC: 196 MG/DL (ref 74–118)
HCT VFR BLD AUTO: 36.8 % (ref 34.2–44.1)
HGB BLD-MCNC: 11.8 G/DL (ref 12–16)
KETONES UR QL STRIP.AUTO: NEGATIVE
LEUKOCYTE ESTERASE UR QL STRIP.AUTO: NEGATIVE
LYMPHOCYTES # BLD: 0.2 10*3/UL (ref 1–3.2)
LYMPHOCYTES NFR BLD AUTO: 1.6 % (ref 18–39.1)
MCH RBC QN AUTO: 28.6 PG (ref 28–32)
MCHC RBC AUTO-ENTMCNC: 32.1 G/DL (ref 31–35)
MCV RBC AUTO: 89.3 FL (ref 81–99)
MONOCYTES # BLD AUTO: 1.5 10*3/UL (ref 0.2–0.8)
MONOCYTES NFR BLD AUTO: 13.4 % (ref 4.4–11.3)
NEUTS SEG NFR BLD AUTO: 84.5 % (ref 38.7–80)
NITRITE UR QL STRIP.AUTO: NEGATIVE
PLATELET # BLD AUTO: 217 X10E3/UL (ref 140–360)
POTASSIUM SERPL-SCNC: 3.9 MMOL/L (ref 3.5–5.1)
PROT UR QL STRIP.AUTO: (no result)
RBC # BLD AUTO: 4.12 X10E6/UL (ref 3.6–5.1)
SODIUM SERPL-SCNC: 137 MMOL/L (ref 136–145)
SP GR UR STRIP: 1.02 (ref 1.01–1.02)
UROBILINOGEN UR STRIP-MCNC: 1 MG/DL (ref 0.2–1)
WBC #/AREA URNS HPF: (no result) /HPF (ref 0–5)
YEAST URNS QL MICRO: (no result)

## 2023-03-08 PROCEDURE — 93005 ELECTROCARDIOGRAM TRACING: CPT

## 2023-03-08 PROCEDURE — 80053 COMPREHEN METABOLIC PANEL: CPT

## 2023-03-08 PROCEDURE — 36415 COLL VENOUS BLD VENIPUNCTURE: CPT

## 2023-03-08 PROCEDURE — 81001 URINALYSIS AUTO W/SCOPE: CPT

## 2023-03-08 PROCEDURE — 87040 BLOOD CULTURE FOR BACTERIA: CPT

## 2023-03-08 PROCEDURE — 99284 EMERGENCY DEPT VISIT MOD MDM: CPT

## 2023-03-08 PROCEDURE — 71046 X-RAY EXAM CHEST 2 VIEWS: CPT

## 2023-03-08 PROCEDURE — 94799 UNLISTED PULMONARY SVC/PX: CPT

## 2023-03-08 PROCEDURE — 83880 ASSAY OF NATRIURETIC PEPTIDE: CPT

## 2023-03-08 PROCEDURE — 94640 AIRWAY INHALATION TREATMENT: CPT

## 2023-03-08 PROCEDURE — 82553 CREATINE MB FRACTION: CPT

## 2023-03-08 PROCEDURE — 85025 COMPLETE CBC W/AUTO DIFF WBC: CPT

## 2023-03-08 PROCEDURE — 82550 ASSAY OF CK (CPK): CPT

## 2023-03-08 PROCEDURE — 71045 X-RAY EXAM CHEST 1 VIEW: CPT

## 2023-03-08 PROCEDURE — 84484 ASSAY OF TROPONIN QUANT: CPT

## 2023-03-08 PROCEDURE — 80048 BASIC METABOLIC PNL TOTAL CA: CPT

## 2023-03-08 PROCEDURE — 83605 ASSAY OF LACTIC ACID: CPT

## 2023-03-09 VITALS — SYSTOLIC BLOOD PRESSURE: 121 MMHG | DIASTOLIC BLOOD PRESSURE: 49 MMHG

## 2023-03-09 VITALS — DIASTOLIC BLOOD PRESSURE: 56 MMHG | SYSTOLIC BLOOD PRESSURE: 129 MMHG

## 2023-03-09 VITALS — SYSTOLIC BLOOD PRESSURE: 116 MMHG | DIASTOLIC BLOOD PRESSURE: 54 MMHG

## 2023-03-09 VITALS — DIASTOLIC BLOOD PRESSURE: 55 MMHG | SYSTOLIC BLOOD PRESSURE: 113 MMHG

## 2023-03-09 VITALS — DIASTOLIC BLOOD PRESSURE: 47 MMHG | SYSTOLIC BLOOD PRESSURE: 121 MMHG

## 2023-03-09 VITALS — SYSTOLIC BLOOD PRESSURE: 124 MMHG | DIASTOLIC BLOOD PRESSURE: 50 MMHG

## 2023-03-09 VITALS — SYSTOLIC BLOOD PRESSURE: 113 MMHG | DIASTOLIC BLOOD PRESSURE: 55 MMHG

## 2023-03-09 LAB
ANION GAP SERPL CALC-SCNC: 13 MMOL/L (ref 8–16)
BASOPHILS # BLD AUTO: 0.1 10*3/UL (ref 0–0.1)
BASOPHILS NFR BLD AUTO: 0.5 % (ref 0–1)
BUN SERPL-MCNC: 14 MG/DL (ref 7–26)
BUN/CREAT SERPL: 18 (ref 6–25)
CALCIUM SERPL-MCNC: 8.1 MG/DL (ref 8.4–10.2)
CHLORIDE SERPL-SCNC: 105 MMOL/L (ref 98–107)
CK MB SERPL-MCNC: 2 NG/ML (ref 0–5)
CK SERPL-CCNC: 89 IU/L (ref 29–168)
CO2 SERPL-SCNC: 24 MMOL/L (ref 22–29)
DEPRECATED NEUTROPHILS # BLD AUTO: 11 10*3/UL (ref 2.1–6.9)
EOSINOPHIL # BLD AUTO: 0.1 10*3/UL (ref 0–0.4)
EOSINOPHIL NFR BLD AUTO: 0.7 % (ref 0–6)
ERYTHROCYTE [DISTWIDTH] IN CORD BLOOD: 13.9 % (ref 11.7–14.4)
GLUCOSE SERPLBLD-MCNC: 121 MG/DL (ref 74–118)
HCT VFR BLD AUTO: 30 % (ref 34.2–44.1)
HGB BLD-MCNC: 9.8 G/DL (ref 12–16)
LYMPHOCYTES # BLD: 0.5 10*3/UL (ref 1–3.2)
LYMPHOCYTES NFR BLD AUTO: 3.8 % (ref 18–39.1)
LYMPHOCYTES NFR BLD MANUAL: 1 % (ref 19–48)
MCH RBC QN AUTO: 28.7 PG (ref 28–32)
MCHC RBC AUTO-ENTMCNC: 32.7 G/DL (ref 31–35)
MCV RBC AUTO: 88 FL (ref 81–99)
MONOCYTES # BLD AUTO: 1.4 10*3/UL (ref 0.2–0.8)
MONOCYTES NFR BLD AUTO: 10.6 % (ref 4.4–11.3)
MONOCYTES NFR BLD MANUAL: 7 % (ref 3.4–9)
NEUTS BAND NFR BLD MANUAL: 3 %
NEUTS SEG NFR BLD AUTO: 83.7 % (ref 38.7–80)
NEUTS SEG NFR BLD MANUAL: 89 % (ref 40–74)
PLAT MORPH BLD: NORMAL
PLATELET # BLD AUTO: 194 X10E3/UL (ref 140–360)
PLATELET # BLD EST: ADEQUATE 10*3/UL
POTASSIUM SERPL-SCNC: 4 MMOL/L (ref 3.5–5.1)
RBC # BLD AUTO: 3.41 X10E6/UL (ref 3.6–5.1)
RBC MORPH BLD: NORMAL
SODIUM SERPL-SCNC: 138 MMOL/L (ref 136–145)
TOXIC GRANULES BLD QL SMEAR: (no result)

## 2023-03-09 RX ADMIN — METOPROLOL SUCCINATE SCH MG: 50 TABLET, EXTENDED RELEASE ORAL at 09:20

## 2023-03-09 RX ADMIN — Medication SCH ML: at 11:15

## 2023-03-09 RX ADMIN — POLYETHYLENE GLYCOL 3350 SCH GM: 17 POWDER, FOR SOLUTION ORAL at 09:00

## 2023-03-09 RX ADMIN — Medication SCH ML: at 23:25

## 2023-03-09 RX ADMIN — Medication SCH MG: at 09:20

## 2023-03-09 RX ADMIN — PRAVASTATIN SODIUM SCH MG: 20 TABLET ORAL at 09:20

## 2023-03-09 RX ADMIN — PANTOPRAZOLE SODIUM SCH MG: 40 TABLET, DELAYED RELEASE ORAL at 07:30

## 2023-03-09 RX ADMIN — Medication SCH ML: at 19:35

## 2023-03-09 RX ADMIN — Medication SCH MG: at 12:16

## 2023-03-09 RX ADMIN — AZITHROMYCIN SCH MG: 250 TABLET, FILM COATED ORAL at 11:09

## 2023-03-09 RX ADMIN — FLUTICASONE PROPIONATE SCH EA: 50 SPRAY, METERED NASAL at 16:06

## 2023-03-09 RX ADMIN — METHYLPREDNISOLONE SODIUM SUCCINATE SCH MG: 40 INJECTION, POWDER, LYOPHILIZED, FOR SOLUTION INTRAMUSCULAR; INTRAVENOUS at 14:14

## 2023-03-09 RX ADMIN — Medication SCH MG: at 09:19

## 2023-03-09 RX ADMIN — FLUTICASONE PROPIONATE AND SALMETEROL SCH EA: 50; 100 POWDER RESPIRATORY (INHALATION) at 07:39

## 2023-03-09 RX ADMIN — Medication SCH ML: at 15:00

## 2023-03-09 RX ADMIN — AMIODARONE HYDROCHLORIDE SCH MG: 200 TABLET ORAL at 09:17

## 2023-03-09 RX ADMIN — METHYLPREDNISOLONE SODIUM SUCCINATE SCH MG: 40 INJECTION, POWDER, LYOPHILIZED, FOR SOLUTION INTRAMUSCULAR; INTRAVENOUS at 21:18

## 2023-03-09 RX ADMIN — FLUTICASONE PROPIONATE AND SALMETEROL SCH EA: 50; 100 POWDER RESPIRATORY (INHALATION) at 19:37

## 2023-03-09 RX ADMIN — ASPIRIN 81 MG CHEWABLE TABLET SCH MG: 81 TABLET CHEWABLE at 09:17

## 2023-03-10 VITALS — SYSTOLIC BLOOD PRESSURE: 125 MMHG | DIASTOLIC BLOOD PRESSURE: 57 MMHG

## 2023-03-10 VITALS — DIASTOLIC BLOOD PRESSURE: 54 MMHG | SYSTOLIC BLOOD PRESSURE: 118 MMHG

## 2023-03-10 VITALS — SYSTOLIC BLOOD PRESSURE: 121 MMHG | DIASTOLIC BLOOD PRESSURE: 55 MMHG

## 2023-03-10 VITALS — DIASTOLIC BLOOD PRESSURE: 47 MMHG | SYSTOLIC BLOOD PRESSURE: 124 MMHG

## 2023-03-10 VITALS — DIASTOLIC BLOOD PRESSURE: 55 MMHG | SYSTOLIC BLOOD PRESSURE: 121 MMHG

## 2023-03-10 VITALS — SYSTOLIC BLOOD PRESSURE: 121 MMHG | DIASTOLIC BLOOD PRESSURE: 47 MMHG

## 2023-03-10 LAB
ANION GAP SERPL CALC-SCNC: 14.4 MMOL/L (ref 8–16)
BASOPHILS # BLD AUTO: 0.1 10*3/UL (ref 0–0.1)
BASOPHILS NFR BLD AUTO: 0.5 % (ref 0–1)
BUN SERPL-MCNC: 20 MG/DL (ref 7–26)
BUN/CREAT SERPL: 20 (ref 6–25)
CALCIUM SERPL-MCNC: 8.4 MG/DL (ref 8.4–10.2)
CHLORIDE SERPL-SCNC: 107 MMOL/L (ref 98–107)
CO2 SERPL-SCNC: 26 MMOL/L (ref 22–29)
DEPRECATED NEUTROPHILS # BLD AUTO: 11.7 10*3/UL (ref 2.1–6.9)
EOSINOPHIL # BLD AUTO: 0 10*3/UL (ref 0–0.4)
EOSINOPHIL NFR BLD AUTO: 0 % (ref 0–6)
ERYTHROCYTE [DISTWIDTH] IN CORD BLOOD: 14.3 % (ref 11.7–14.4)
GLUCOSE SERPLBLD-MCNC: 189 MG/DL (ref 74–118)
HCT VFR BLD AUTO: 30.8 % (ref 34.2–44.1)
HGB BLD-MCNC: 9.7 G/DL (ref 12–16)
LYMPHOCYTES # BLD: 0.3 10*3/UL (ref 1–3.2)
LYMPHOCYTES NFR BLD AUTO: 1.9 % (ref 18–39.1)
MCH RBC QN AUTO: 28.4 PG (ref 28–32)
MCHC RBC AUTO-ENTMCNC: 31.5 G/DL (ref 31–35)
MCV RBC AUTO: 90.1 FL (ref 81–99)
MONOCYTES # BLD AUTO: 0.7 10*3/UL (ref 0.2–0.8)
MONOCYTES NFR BLD AUTO: 5.7 % (ref 4.4–11.3)
NEUTS SEG NFR BLD AUTO: 90.9 % (ref 38.7–80)
PLATELET # BLD AUTO: 221 X10E3/UL (ref 140–360)
POTASSIUM SERPL-SCNC: 4.4 MMOL/L (ref 3.5–5.1)
RBC # BLD AUTO: 3.42 X10E6/UL (ref 3.6–5.1)
SODIUM SERPL-SCNC: 143 MMOL/L (ref 136–145)

## 2023-03-10 RX ADMIN — LEVOTHYROXINE SODIUM SCH MCG: 50 TABLET ORAL at 05:35

## 2023-03-10 RX ADMIN — PANTOPRAZOLE SODIUM SCH MG: 40 TABLET, DELAYED RELEASE ORAL at 08:37

## 2023-03-10 RX ADMIN — METHYLPREDNISOLONE SODIUM SUCCINATE SCH MG: 40 INJECTION, POWDER, LYOPHILIZED, FOR SOLUTION INTRAMUSCULAR; INTRAVENOUS at 22:24

## 2023-03-10 RX ADMIN — AZITHROMYCIN SCH MG: 250 TABLET, FILM COATED ORAL at 08:38

## 2023-03-10 RX ADMIN — METHYLPREDNISOLONE SODIUM SUCCINATE SCH MG: 40 INJECTION, POWDER, LYOPHILIZED, FOR SOLUTION INTRAMUSCULAR; INTRAVENOUS at 12:38

## 2023-03-10 RX ADMIN — FLUTICASONE PROPIONATE SCH EA: 50 SPRAY, METERED NASAL at 08:39

## 2023-03-10 RX ADMIN — METOPROLOL SUCCINATE SCH MG: 50 TABLET, EXTENDED RELEASE ORAL at 08:38

## 2023-03-10 RX ADMIN — Medication SCH MG: at 08:36

## 2023-03-10 RX ADMIN — Medication SCH ML: at 07:20

## 2023-03-10 RX ADMIN — ASPIRIN 81 MG CHEWABLE TABLET SCH MG: 81 TABLET CHEWABLE at 08:37

## 2023-03-10 RX ADMIN — Medication SCH ML: at 23:00

## 2023-03-10 RX ADMIN — POLYETHYLENE GLYCOL 3350 SCH GM: 17 POWDER, FOR SOLUTION ORAL at 08:38

## 2023-03-10 RX ADMIN — PRAVASTATIN SODIUM SCH MG: 20 TABLET ORAL at 08:37

## 2023-03-10 RX ADMIN — Medication SCH ML: at 14:00

## 2023-03-10 RX ADMIN — AMIODARONE HYDROCHLORIDE SCH MG: 200 TABLET ORAL at 08:37

## 2023-03-10 RX ADMIN — FLUTICASONE PROPIONATE AND SALMETEROL SCH EA: 50; 100 POWDER RESPIRATORY (INHALATION) at 07:00

## 2023-03-10 RX ADMIN — FLUTICASONE PROPIONATE AND SALMETEROL SCH EA: 50; 100 POWDER RESPIRATORY (INHALATION) at 19:30

## 2023-03-10 RX ADMIN — Medication SCH MG: at 08:37

## 2023-03-10 RX ADMIN — FLUTICASONE PROPIONATE SCH EA: 50 SPRAY, METERED NASAL at 15:37

## 2023-03-10 RX ADMIN — Medication SCH ML: at 19:10

## 2023-03-10 RX ADMIN — Medication SCH ML: at 10:00

## 2023-03-10 RX ADMIN — Medication SCH ML: at 03:02

## 2023-03-10 RX ADMIN — METHYLPREDNISOLONE SODIUM SUCCINATE SCH MG: 40 INJECTION, POWDER, LYOPHILIZED, FOR SOLUTION INTRAMUSCULAR; INTRAVENOUS at 05:35

## 2023-03-11 VITALS — SYSTOLIC BLOOD PRESSURE: 129 MMHG | DIASTOLIC BLOOD PRESSURE: 60 MMHG

## 2023-03-11 VITALS — SYSTOLIC BLOOD PRESSURE: 116 MMHG | DIASTOLIC BLOOD PRESSURE: 76 MMHG

## 2023-03-11 VITALS — DIASTOLIC BLOOD PRESSURE: 61 MMHG | SYSTOLIC BLOOD PRESSURE: 125 MMHG

## 2023-03-11 VITALS — DIASTOLIC BLOOD PRESSURE: 60 MMHG | SYSTOLIC BLOOD PRESSURE: 130 MMHG

## 2023-03-11 VITALS — SYSTOLIC BLOOD PRESSURE: 121 MMHG | DIASTOLIC BLOOD PRESSURE: 66 MMHG

## 2023-03-11 VITALS — DIASTOLIC BLOOD PRESSURE: 60 MMHG | SYSTOLIC BLOOD PRESSURE: 129 MMHG

## 2023-03-11 VITALS — SYSTOLIC BLOOD PRESSURE: 116 MMHG | DIASTOLIC BLOOD PRESSURE: 64 MMHG

## 2023-03-11 VITALS — SYSTOLIC BLOOD PRESSURE: 130 MMHG | DIASTOLIC BLOOD PRESSURE: 60 MMHG

## 2023-03-11 LAB
BASOPHILS # BLD AUTO: 0.1 10*3/UL (ref 0–0.1)
BASOPHILS NFR BLD AUTO: 0.5 % (ref 0–1)
DEPRECATED NEUTROPHILS # BLD AUTO: 14.3 10*3/UL (ref 2.1–6.9)
EOSINOPHIL # BLD AUTO: 0 10*3/UL (ref 0–0.4)
EOSINOPHIL NFR BLD AUTO: 0 % (ref 0–6)
ERYTHROCYTE [DISTWIDTH] IN CORD BLOOD: 14.5 % (ref 11.7–14.4)
HCT VFR BLD AUTO: 35 % (ref 34.2–44.1)
HGB BLD-MCNC: 11.2 G/DL (ref 12–16)
LYMPHOCYTES # BLD: 0.3 10*3/UL (ref 1–3.2)
LYMPHOCYTES NFR BLD AUTO: 2.1 % (ref 18–39.1)
LYMPHOCYTES NFR BLD MANUAL: 1 % (ref 19–48)
MCH RBC QN AUTO: 28.9 PG (ref 28–32)
MCHC RBC AUTO-ENTMCNC: 32 G/DL (ref 31–35)
MCV RBC AUTO: 90.2 FL (ref 81–99)
MONOCYTES # BLD AUTO: 0.5 10*3/UL (ref 0.2–0.8)
MONOCYTES NFR BLD AUTO: 3.3 % (ref 4.4–11.3)
MONOCYTES NFR BLD MANUAL: 2 % (ref 3.4–9)
NEUTS SEG NFR BLD AUTO: 92.5 % (ref 38.7–80)
NEUTS SEG NFR BLD MANUAL: 97 % (ref 40–74)
PLAT MORPH BLD: NORMAL
PLATELET # BLD AUTO: 317 X10E3/UL (ref 140–360)
PLATELET # BLD EST: ADEQUATE 10*3/UL
POIKILOCYTOSIS BLD QL SMEAR: 1
RBC # BLD AUTO: 3.88 X10E6/UL (ref 3.6–5.1)
RBC MORPH BLD: NORMAL
ROULEAUX BLD QL SMEAR: 1

## 2023-03-11 RX ADMIN — FLUTICASONE PROPIONATE AND SALMETEROL SCH EA: 50; 100 POWDER RESPIRATORY (INHALATION) at 06:43

## 2023-03-11 RX ADMIN — Medication SCH ML: at 03:00

## 2023-03-11 RX ADMIN — METHYLPREDNISOLONE SODIUM SUCCINATE SCH MG: 40 INJECTION, POWDER, LYOPHILIZED, FOR SOLUTION INTRAMUSCULAR; INTRAVENOUS at 06:00

## 2023-03-11 RX ADMIN — PANTOPRAZOLE SODIUM SCH MG: 40 TABLET, DELAYED RELEASE ORAL at 08:29

## 2023-03-11 RX ADMIN — Medication SCH ML: at 06:30

## 2023-03-11 RX ADMIN — METHYLPREDNISOLONE SODIUM SUCCINATE SCH MG: 40 INJECTION, POWDER, LYOPHILIZED, FOR SOLUTION INTRAMUSCULAR; INTRAVENOUS at 14:00

## 2023-03-11 RX ADMIN — Medication SCH MG: at 08:26

## 2023-03-11 RX ADMIN — Medication SCH ML: at 14:45

## 2023-03-11 RX ADMIN — Medication SCH ML: at 01:35

## 2023-03-11 RX ADMIN — POLYETHYLENE GLYCOL 3350 SCH GM: 17 POWDER, FOR SOLUTION ORAL at 08:29

## 2023-03-11 RX ADMIN — FLUTICASONE PROPIONATE AND SALMETEROL SCH EA: 50; 100 POWDER RESPIRATORY (INHALATION) at 21:25

## 2023-03-11 RX ADMIN — METOPROLOL SUCCINATE SCH MG: 50 TABLET, EXTENDED RELEASE ORAL at 08:27

## 2023-03-11 RX ADMIN — PRAVASTATIN SODIUM SCH MG: 20 TABLET ORAL at 08:28

## 2023-03-11 RX ADMIN — AZITHROMYCIN SCH MG: 250 TABLET, FILM COATED ORAL at 08:27

## 2023-03-11 RX ADMIN — Medication SCH MG: at 08:27

## 2023-03-11 RX ADMIN — Medication SCH ML: at 21:25

## 2023-03-11 RX ADMIN — METHYLPREDNISOLONE SODIUM SUCCINATE SCH MG: 40 INJECTION, POWDER, LYOPHILIZED, FOR SOLUTION INTRAMUSCULAR; INTRAVENOUS at 13:35

## 2023-03-11 RX ADMIN — AMIODARONE HYDROCHLORIDE SCH MG: 200 TABLET ORAL at 08:28

## 2023-03-11 RX ADMIN — ASPIRIN 81 MG CHEWABLE TABLET SCH MG: 81 TABLET CHEWABLE at 08:29

## 2023-03-11 RX ADMIN — FLUTICASONE PROPIONATE SCH EA: 50 SPRAY, METERED NASAL at 17:00

## 2023-03-11 RX ADMIN — LEVOTHYROXINE SODIUM SCH MCG: 50 TABLET ORAL at 06:00

## 2023-03-11 RX ADMIN — FLUTICASONE PROPIONATE SCH EA: 50 SPRAY, METERED NASAL at 08:33

## 2023-03-11 RX ADMIN — Medication SCH ML: at 10:50

## 2023-03-12 VITALS — SYSTOLIC BLOOD PRESSURE: 123 MMHG | DIASTOLIC BLOOD PRESSURE: 56 MMHG

## 2023-03-12 VITALS — SYSTOLIC BLOOD PRESSURE: 123 MMHG | DIASTOLIC BLOOD PRESSURE: 55 MMHG

## 2023-03-12 VITALS — SYSTOLIC BLOOD PRESSURE: 132 MMHG | DIASTOLIC BLOOD PRESSURE: 50 MMHG

## 2023-03-12 VITALS — DIASTOLIC BLOOD PRESSURE: 63 MMHG | SYSTOLIC BLOOD PRESSURE: 125 MMHG

## 2023-03-12 LAB
BASOPHILS # BLD AUTO: 0.1 10*3/UL (ref 0–0.1)
BASOPHILS NFR BLD AUTO: 0.3 % (ref 0–1)
DEPRECATED NEUTROPHILS # BLD AUTO: 13.8 10*3/UL (ref 2.1–6.9)
EOSINOPHIL # BLD AUTO: 0 10*3/UL (ref 0–0.4)
EOSINOPHIL NFR BLD AUTO: 0 % (ref 0–6)
ERYTHROCYTE [DISTWIDTH] IN CORD BLOOD: 14.3 % (ref 11.7–14.4)
HCT VFR BLD AUTO: 29 % (ref 34.2–44.1)
HGB BLD-MCNC: 9.3 G/DL (ref 12–16)
LYMPHOCYTES # BLD: 0.7 10*3/UL (ref 1–3.2)
LYMPHOCYTES NFR BLD AUTO: 4.1 % (ref 18–39.1)
MCH RBC QN AUTO: 28.2 PG (ref 28–32)
MCHC RBC AUTO-ENTMCNC: 32.1 G/DL (ref 31–35)
MCV RBC AUTO: 87.9 FL (ref 81–99)
MONOCYTES # BLD AUTO: 1.1 10*3/UL (ref 0.2–0.8)
MONOCYTES NFR BLD AUTO: 7.1 % (ref 4.4–11.3)
NEUTS SEG NFR BLD AUTO: 86.4 % (ref 38.7–80)
PLATELET # BLD AUTO: 281 X10E3/UL (ref 140–360)
RBC # BLD AUTO: 3.3 X10E6/UL (ref 3.6–5.1)

## 2023-03-12 RX ADMIN — FLUTICASONE PROPIONATE SCH EA: 50 SPRAY, METERED NASAL at 08:16

## 2023-03-12 RX ADMIN — METOPROLOL SUCCINATE SCH MG: 50 TABLET, EXTENDED RELEASE ORAL at 08:14

## 2023-03-12 RX ADMIN — PRAVASTATIN SODIUM SCH MG: 20 TABLET ORAL at 08:14

## 2023-03-12 RX ADMIN — Medication SCH ML: at 06:00

## 2023-03-12 RX ADMIN — PANTOPRAZOLE SODIUM SCH MG: 40 TABLET, DELAYED RELEASE ORAL at 08:15

## 2023-03-12 RX ADMIN — METHYLPREDNISOLONE SODIUM SUCCINATE SCH MG: 40 INJECTION, POWDER, LYOPHILIZED, FOR SOLUTION INTRAMUSCULAR; INTRAVENOUS at 08:15

## 2023-03-12 RX ADMIN — Medication SCH MG: at 08:14

## 2023-03-12 RX ADMIN — AZITHROMYCIN SCH MG: 250 TABLET, FILM COATED ORAL at 08:15

## 2023-03-12 RX ADMIN — FLUTICASONE PROPIONATE AND SALMETEROL SCH EA: 50; 100 POWDER RESPIRATORY (INHALATION) at 06:28

## 2023-03-12 RX ADMIN — LEVOTHYROXINE SODIUM SCH MCG: 50 TABLET ORAL at 05:21

## 2023-03-12 RX ADMIN — Medication SCH ML: at 03:00

## 2023-03-12 RX ADMIN — ASPIRIN 81 MG CHEWABLE TABLET SCH MG: 81 TABLET CHEWABLE at 08:15

## 2023-03-12 RX ADMIN — POLYETHYLENE GLYCOL 3350 SCH GM: 17 POWDER, FOR SOLUTION ORAL at 08:16

## 2023-03-12 RX ADMIN — AMIODARONE HYDROCHLORIDE SCH MG: 200 TABLET ORAL at 08:14

## 2023-06-04 ENCOUNTER — HOSPITAL ENCOUNTER (EMERGENCY)
Dept: HOSPITAL 88 - ER | Age: 88
LOS: 1 days | Discharge: HOME | End: 2023-06-05
Payer: MEDICARE

## 2023-06-04 VITALS — WEIGHT: 124 LBS | BODY MASS INDEX: 20.66 KG/M2 | HEIGHT: 65 IN

## 2023-06-04 DIAGNOSIS — I25.10: ICD-10-CM

## 2023-06-04 DIAGNOSIS — E03.9: ICD-10-CM

## 2023-06-04 DIAGNOSIS — J44.9: ICD-10-CM

## 2023-06-04 DIAGNOSIS — I10: ICD-10-CM

## 2023-06-04 DIAGNOSIS — R60.9: Primary | ICD-10-CM

## 2023-06-04 DIAGNOSIS — Z86.73: ICD-10-CM

## 2023-06-04 DIAGNOSIS — E78.5: ICD-10-CM

## 2023-06-04 LAB
ALBUMIN SERPL-MCNC: 3.6 G/DL (ref 3.5–5)
ALBUMIN/GLOB SERPL: 1.1 {RATIO} (ref 0.8–2)
ALP SERPL-CCNC: 52 IU/L (ref 40–150)
ALT SERPL-CCNC: 21 IU/L (ref 0–55)
ANION GAP SERPL CALC-SCNC: 17.1 MMOL/L (ref 8–16)
BASOPHILS # BLD AUTO: 0.1 10*3/UL (ref 0–0.1)
BASOPHILS NFR BLD AUTO: 0.7 % (ref 0–1)
BUN SERPL-MCNC: 31 MG/DL (ref 7–26)
BUN/CREAT SERPL: 23 (ref 6–25)
CALCIUM SERPL-MCNC: 8.9 MG/DL (ref 8.4–10.2)
CHLORIDE SERPL-SCNC: 105 MMOL/L (ref 98–107)
CO2 SERPL-SCNC: 24 MMOL/L (ref 22–29)
DEPRECATED NEUTROPHILS # BLD AUTO: 4.6 10*3/UL (ref 2.1–6.9)
EOSINOPHIL # BLD AUTO: 0.1 10*3/UL (ref 0–0.4)
EOSINOPHIL NFR BLD AUTO: 1 % (ref 0–6)
ERYTHROCYTE [DISTWIDTH] IN CORD BLOOD: 14.5 % (ref 11.7–14.4)
GLOBULIN PLAS-MCNC: 3.2 G/DL (ref 2.3–3.5)
GLUCOSE SERPLBLD-MCNC: 107 MG/DL (ref 74–118)
HCT VFR BLD AUTO: 35.5 % (ref 34.2–44.1)
HGB BLD-MCNC: 10.9 G/DL (ref 12–16)
LYMPHOCYTES # BLD: 1.3 10*3/UL (ref 1–3.2)
LYMPHOCYTES NFR BLD AUTO: 19.2 % (ref 18–39.1)
MCH RBC QN AUTO: 27.5 PG (ref 28–32)
MCHC RBC AUTO-ENTMCNC: 30.7 G/DL (ref 31–35)
MCV RBC AUTO: 89.6 FL (ref 81–99)
MONOCYTES # BLD AUTO: 0.9 10*3/UL (ref 0.2–0.8)
MONOCYTES NFR BLD AUTO: 12.7 % (ref 4.4–11.3)
NEUTS SEG NFR BLD AUTO: 66.1 % (ref 38.7–80)
PLATELET # BLD AUTO: 153 X10E3/UL (ref 140–360)
POTASSIUM SERPL-SCNC: 4.1 MMOL/L (ref 3.5–5.1)
RBC # BLD AUTO: 3.96 X10E6/UL (ref 3.6–5.1)
SODIUM SERPL-SCNC: 142 MMOL/L (ref 136–145)

## 2023-06-04 PROCEDURE — 85025 COMPLETE CBC W/AUTO DIFF WBC: CPT

## 2023-06-04 PROCEDURE — 80053 COMPREHEN METABOLIC PANEL: CPT

## 2023-06-04 PROCEDURE — 36415 COLL VENOUS BLD VENIPUNCTURE: CPT

## 2023-06-04 PROCEDURE — 99284 EMERGENCY DEPT VISIT MOD MDM: CPT

## 2023-06-05 VITALS — OXYGEN SATURATION: 100 %
